# Patient Record
Sex: FEMALE | Race: WHITE | Employment: UNEMPLOYED | URBAN - METROPOLITAN AREA
[De-identification: names, ages, dates, MRNs, and addresses within clinical notes are randomized per-mention and may not be internally consistent; named-entity substitution may affect disease eponyms.]

---

## 2017-07-21 PROBLEM — Z37.9 NORMAL LABOR: Status: ACTIVE | Noted: 2017-07-21

## 2017-10-19 ENCOUNTER — APPOINTMENT (OUTPATIENT)
Dept: GENERAL RADIOLOGY | Age: 32
End: 2017-10-19
Attending: EMERGENCY MEDICINE
Payer: MEDICAID

## 2017-10-19 ENCOUNTER — HOSPITAL ENCOUNTER (EMERGENCY)
Age: 32
Discharge: HOME | End: 2017-10-19
Attending: EMERGENCY MEDICINE | Admitting: EMERGENCY MEDICINE
Payer: MEDICAID

## 2017-10-19 VITALS
HEART RATE: 96 BPM | RESPIRATION RATE: 20 BRPM | HEIGHT: 58 IN | BODY MASS INDEX: 29.39 KG/M2 | DIASTOLIC BLOOD PRESSURE: 92 MMHG | WEIGHT: 140 LBS | SYSTOLIC BLOOD PRESSURE: 147 MMHG | OXYGEN SATURATION: 100 % | TEMPERATURE: 97.6 F

## 2017-10-19 DIAGNOSIS — S62.665A CLOSED NONDISPLACED FRACTURE OF DISTAL PHALANX OF LEFT RING FINGER, INITIAL ENCOUNTER: Primary | ICD-10-CM

## 2017-10-19 PROCEDURE — 73130 X-RAY EXAM OF HAND: CPT

## 2017-10-19 PROCEDURE — 77030008323 HC SPLNT FNGR GTR DJOR -A

## 2017-10-19 PROCEDURE — 99282 EMERGENCY DEPT VISIT SF MDM: CPT

## 2017-10-19 PROCEDURE — 74011250637 HC RX REV CODE- 250/637: Performed by: PHYSICIAN ASSISTANT

## 2017-10-19 RX ORDER — HYDROCODONE BITARTRATE AND ACETAMINOPHEN 5; 325 MG/1; MG/1
1 TABLET ORAL
Qty: 20 TAB | Refills: 0 | Status: SHIPPED | OUTPATIENT
Start: 2017-10-19 | End: 2018-10-10

## 2017-10-19 RX ORDER — HYDROCODONE BITARTRATE AND ACETAMINOPHEN 5; 325 MG/1; MG/1
1 TABLET ORAL
Status: COMPLETED | OUTPATIENT
Start: 2017-10-19 | End: 2017-10-19

## 2017-10-19 RX ADMIN — HYDROCODONE BITARTRATE AND ACETAMINOPHEN 1 TABLET: 5; 325 TABLET ORAL at 09:40

## 2017-10-19 NOTE — ED PROVIDER NOTES
HPI Comments: 30yoF to ED c/o left 4th finger pain onset last night after wrestling. Pt states she bent her finger somehow and felt a snap. Worsening pain and swelling today. Pt is right hand dominant. Patient is a 29 y.o. female presenting with hand injury. The history is provided by the patient. Hand Injury           Past Medical History:   Diagnosis Date    Anemia NEC     Bronchitis     H/O hernia repair     Psychiatric disorder     bipolar, depression, PTSD       Past Surgical History:   Procedure Laterality Date    ABDOMEN SURGERY PROC UNLISTED      hernia     DELIVERY ONLY      4 previous    HX OTHER SURGICAL           No family history on file. Social History     Social History    Marital status: SINGLE     Spouse name: N/A    Number of children: N/A    Years of education: N/A     Occupational History    Not on file. Social History Main Topics    Smoking status: Never Smoker    Smokeless tobacco: Never Used    Alcohol use No    Drug use: No    Sexual activity: Yes     Partners: Male     Birth control/ protection: None      Comment: last menses in 2012     Other Topics Concern    Not on file     Social History Narrative         ALLERGIES: Percocet [oxycodone-acetaminophen] and Zofran odt [ondansetron]    Review of Systems   Musculoskeletal: Positive for arthralgias and joint swelling. All other systems reviewed and are negative. Vitals:    10/19/17 0905   BP: (!) 147/92   Pulse: 96   Resp: 20   Temp: 97.6 °F (36.4 °C)   SpO2: 100%   Weight: 63.5 kg (140 lb)   Height: 4' 10\" (1.473 m)            Physical Exam   Constitutional: She appears well-developed and well-nourished. No distress. HENT:   Head: Normocephalic and atraumatic. Musculoskeletal:        Hands:  Skin: She is not diaphoretic. MDM  Number of Diagnoses or Management Options  Diagnosis management comments: Pt c/o left 4th finger pain s/p injury last night.  Fx noted on exam. Will splint and refer to ortho. Pt agrees with plan. WENDY Jaramillo 9:35 AM         Amount and/or Complexity of Data Reviewed  Tests in the radiology section of CPT®: reviewed and ordered    Risk of Complications, Morbidity, and/or Mortality  Presenting problems: low  Diagnostic procedures: low  Management options: low    Patient Progress  Patient progress: improved    ED Course       Splint, Finger  Date/Time: 10/19/2017 9:36 AM  Performed by: Kendrick Byrnes  Authorized by: Kendrick Byrnes     Consent:     Consent obtained:  Verbal    Consent given by:  Patient    Risks discussed:  Discoloration and numbness  Pre-procedure details:     Sensation:  Normal    Skin color:  Pink  Procedure details:     Laterality:  Left    Location:  Finger    Finger:  L ring finger    Splint type: aluminum     Supplies:  Aluminum splint  Post-procedure details:     Pain:  Unchanged    Sensation:  Normal    Skin color:  Pink    Patient tolerance of procedure: Tolerated well, no immediate complications                 Vitals:  Patient Vitals for the past 12 hrs:   Temp Pulse Resp BP SpO2   10/19/17 0905 97.6 °F (36.4 °C) 96 20 (!) 147/92 100 %       Medications Ordered:  Medications   HYDROcodone-acetaminophen (NORCO) 5-325 mg per tablet 1 Tab (1 Tab Oral Given 10/19/17 0940)       Lab Findings:  No results found for this or any previous visit (from the past 12 hour(s)). X-ray, CT or radiology findings or impressions:  Xr Hand Lt Min 3 V    Result Date: 10/19/2017  Left Hand Three Views CPT CODE: 86448 HISTORY: Left hand pain following wrestling injury. COMPARISON: None. FINDINGS: Three views obtained. There is mild soft tissue swelling of the fourth digit. Transverse fracture minimally offset of the distal aspect of the middle phalanx of the fourth digit. The joint spaces are maintained. There is no fracture or dislocation.  Mineralization is normal.      IMPRESSION: Minimally displaced transverse fracture through the distal aspect of the middle phalanx of the fourth digit. Diagnosis:   1. Closed nondisplaced fracture of distal phalanx of left ring finger, initial encounter        Disposition: Home    Follow-up Information     Follow up With Details Comments [address], [place]   [address] [address]  [phone]             Patient's Medications   Start Taking    HYDROCODONE-ACETAMINOPHEN (NORCO) 5-325 MG PER TABLET    Take 1 Tab by mouth every four (4) hours as needed for Pain. Max Daily Amount: 6 Tabs. Continue Taking    ACETAMINOPHEN (TYLENOL) 325 MG TABLET    Take 650 mg by mouth every four (4) hours as needed for Pain. FERROUS SULFATE 325 MG (65 MG IRON) TABLET    Take 1 Tab by mouth Daily (before breakfast). NORETHINDRONE-ETHINYL ESTRADIOL-IRON (MICROGESTIN FE1.5/30) 1.5-30 MG-MCG TABLET    Take 1 tablet by mouth daily. PNV W/O CALCIUM-IRON FUM-FA 27-1 MG TAB    Take 1 Tab by mouth daily. QUETIAPINE (SEROQUEL) 100 MG TABLET    Take 1 Tab by mouth two (2) times a day. SERTRALINE (ZOLOFT) 100 MG TABLET    Take 0.5 Tabs by mouth daily. These Medications have changed    No medications on file   Stop Taking    No medications on file   . ed

## 2017-10-19 NOTE — DISCHARGE INSTRUCTIONS
Finger Fracture: Care Instructions  Your Care Instructions    Breaks in the bones of the finger usually heal well in about 3 to 4 weeks. The pain and swelling from a broken finger can last for weeks. But it should steadily improve, starting a few days after you break it. It is very important that you wear and take care of the cast or splint exactly as your doctor tells you to so that your finger heals properly and does not end up crooked. Wearing a splint may interfere with your normal activities. Ask for help with daily tasks if you need it. You heal best when you take good care of yourself. Eat a variety of healthy foods, and don't smoke. Follow-up care is a key part of your treatment and safety. Be sure to make and go to all appointments, and call your doctor if you are having problems. It's also a good idea to know your test results and keep a list of the medicines you take. How can you care for yourself at home? · If your doctor put a splint on your finger, wear the splint exactly as directed. Do not remove it until your doctor says that you can. · Keep your hand raised above the level of your heart as much as you can. This will help reduce swelling. · Put ice or a cold pack on your finger for 10 to 20 minutes at a time. Try to do this every 1 to 2 hours for the next 3 days (when you are awake) or until the swelling goes down. Put a thin cloth between the ice and your skin. Keep the splint dry. · Be safe with medicines. Take pain medicines exactly as directed. ¨ If the doctor gave you a prescription medicine for pain, take it as prescribed. ¨ If you are not taking a prescription pain medicine, ask your doctor if you can take an over-the-counter medicine. When should you call for help? Call 911 anytime you think you may need emergency care. For example, call if:  · Your finger is cool or pale or changes color.   Call your doctor now or seek immediate medical care if:  · Your pain gets much worse.  · You have tingling, weakness, or numbness in your finger. · You have signs of infection, such as:  ¨ Increased pain, swelling, warmth, or redness. ¨ Red streaks leading from the area. ¨ Pus draining from the area. ¨ Swollen lymph nodes in your neck, armpits, or groin. ¨ A fever. Watch closely for changes in your health, and be sure to contact your doctor if:  · Your finger is not steadily improving. Where can you learn more? Go to http://chito-jose manuel.info/. Enter A502 in the search box to learn more about \"Finger Fracture: Care Instructions. \"  Current as of: March 21, 2017  Content Version: 11.3  © 9581-1213 [employer]. Care instructions adapted under license by [employer] (which disclaims liability or warranty for this information). If you have questions about a medical condition or this instruction, always ask your healthcare professional. [place] [address] any warranty or liability for your use of this information.

## 2017-10-19 NOTE — ED NOTES
PT discharged home per provider order, educated patient on discharge instructions, medication, splint and fracture care and follow up care, verbalized understanding, ambulated out of ED

## 2017-10-19 NOTE — LETTER
[address] [place] [address] [facility] EMERGENCY DEPT 
[address] [facility] [address] 
[phone] Work/School Note Date: 10/19/2017 To Whom It May concern: 
 
Teressa Townsend was seen and treated today in the emergency room by the following provider(s): 
Physician Assistant: WENDY Camacho. Teressa Kristian limited duty: no use of injured hand until cleared by ortho. Sincerely, WENDY Camacho

## 2017-10-23 ENCOUNTER — OFFICE VISIT (OUTPATIENT)
Dept: ORTHOPEDIC SURGERY | Facility: CLINIC | Age: 32
End: 2017-10-23

## 2017-10-23 VITALS
HEART RATE: 98 BPM | RESPIRATION RATE: 18 BRPM | HEIGHT: 58 IN | OXYGEN SATURATION: 99 % | DIASTOLIC BLOOD PRESSURE: 74 MMHG | TEMPERATURE: 98.4 F | SYSTOLIC BLOOD PRESSURE: 117 MMHG | WEIGHT: 162.4 LBS | BODY MASS INDEX: 34.09 KG/M2

## 2017-10-23 DIAGNOSIS — M79.642 LEFT HAND PAIN: ICD-10-CM

## 2017-10-23 DIAGNOSIS — S62.665A CLOSED NONDISPLACED FRACTURE OF DISTAL PHALANX OF LEFT RING FINGER, INITIAL ENCOUNTER: Primary | ICD-10-CM

## 2017-10-23 RX ORDER — OXYCODONE AND ACETAMINOPHEN 5; 325 MG/1; MG/1
TABLET ORAL
COMMUNITY
End: 2018-01-22

## 2017-10-23 NOTE — PROGRESS NOTES
Patient: Saintclair Ma                MRN: [id number]       SSN: [id number]  YOB: 1985        AGE: 29 y.o. SEX: female    PCP: Taylor Alberto MD  10/23/17    Chief Complaint   Patient presents with    Hand Pain     Left     HISTORY:  Saintclair Ma is a 29 y.o. female who is seen for a left ring finger pain. She states on 10/19/17 she was wrestling with her boyfriend and injured her left ring finger. She states that she had jumped on his back and her left ring finger got wrapped in his shirt. She states the tip of her left ring finger was hyperflexed and she reduced her own fracture. She noticed the pain increased later on. She was seen at [facility] ED on the same day where x-rays revealed a nondisplaced left ring finger distal phalanx fracture. 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 She states her job recently became a paid position. She lives in [place] with her boyfriend and 7 children- 6, 8, 5, 6 1 and 4 month old. She has 3 boys and 4 girls. She is accompanied by her 1year old son Star Mendez and her 4 month old daughter Tim Grey. Current weight is 162 pounds. She reports she has lost 20 pounds since delivering Tim Grey via . She is 4'10\" tall. She is not hypertensive or diabetic. No results found for: HBA1C, HGBE8, CUO8UIWR, VPJ9WJWZ, YVP2MDSH  Weight Metrics 10/23/2017 10/19/2017 2017 2017 2017 2015 8/15/2015   Weight 162 lb 6.4 oz 140 lb 180 lb 168 lb 165 lb 145 lb 130 lb   BMI 33.94 kg/m2 29.26 kg/m2 35.15 kg/m2 32.81 kg/m2 32.22 kg/m2 28.81 kg/m2 25.83 kg/m2       Patient Active Problem List   Diagnosis Code    Normal labor O80, Z37.9     REVIEW OF SYSTEMS: All Below are Negative except: See HPI   Constitutional: negative for fever, chills, and weight loss.    Cardiovascular: negative for chest pain, claudication, leg swelling, SOB, SALCEDO   Gastrointestinal: Negative for pain, N/V/C/D, Blood in stool or urine, dysuria,  hematuria, incontinence, pelvic pain. Musculoskeletal: See HPI   Neurological: Negative for dizziness and weakness. Negative for headaches, Visual changes, confusion, seizures   Phychiatric/Behavioral: Negative for depression, memory loss, substance  abuse. Extremities: Negative for hair changes, rash, or skin lesion changes. Hematologic: Negative for bleeding problems, bruising, pallor or swollen lymph  nodes   Peripheral Vascular: No calf pain, no circulation deficits. Social History     Social History    Marital status: SINGLE     Spouse name: N/A    Number of children: N/A    Years of education: N/A     Occupational History    Not on file. Social History Main Topics    Smoking status: Never Smoker    Smokeless tobacco: Never Used    Alcohol use No    Drug use: No    Sexual activity: Yes     Partners: Male     Birth control/ protection: None      Comment: last menses in August 2012     Other Topics Concern    Not on file     Social History Narrative      Allergies   Allergen Reactions    Percocet [Oxycodone-Acetaminophen] Itching     Pt denies allergy    Zofran Odt [Ondansetron] Hives      Current Outpatient Prescriptions   Medication Sig    oxyCODONE-acetaminophen (PERCOCET) 5-325 mg per tablet Take  by mouth every four (4) hours as needed for Pain.  HYDROcodone-acetaminophen (NORCO) 5-325 mg per tablet Take 1 Tab by mouth every four (4) hours as needed for Pain. Max Daily Amount: 6 Tabs.  QUEtiapine (SEROQUEL) 100 mg tablet Take 1 Tab by mouth two (2) times a day.  sertraline (ZOLOFT) 100 mg tablet Take 0.5 Tabs by mouth daily.  acetaminophen (TYLENOL) 325 mg tablet Take 650 mg by mouth every four (4) hours as needed for Pain.  norethindrone-ethinyl estradiol-iron (MICROGESTIN FE1.5/30) 1.5-30 mg-mcg tablet Take 1 tablet by mouth daily.     ferrous sulfate 325 mg (65 mg iron) tablet Take 1 Tab by mouth Daily (before breakfast).  pnv w/o calcium-iron fum-fa 27-1 mg tab Take 1 Tab by mouth daily. No current facility-administered medications for this visit. PHYSICAL EXAMINATION:  Visit Vitals    /74    Pulse 98    Temp 98.4 °F (36.9 °C) (Oral)    Resp 18    Ht 4' 10\" (1.473 m)    Wt 162 lb 6.4 oz (73.7 kg)    SpO2 99%    BMI 33.94 kg/m2      ORTHO EXAMINATION:  Examination Right Hand Left Hand   Skin Intact Intact   Deformity - -   Swelling - + distal aspect of middle phalanx   Tenderness - +, distal aspect of middle phalanx   Finger flexion Full Full   Finger extension Full Full   Sensation Normal Normal   Capillary refill Normal Normal   Heberden's nodes - -   Dupuytren's - -   Wearing a volar splint today. RADIOGRAPHS:  XR LEFT HAND 10/19/17  IMPRESSION:   Minimally displaced transverse fracture through the distal aspect of the middle  phalanx of the fourth digit.  -I have independently reviewed these images during this office visit. -Dr. Clark Mjeias:      ICD-10-CM ICD-9-CM    1. Closed nondisplaced fracture of distal phalanx of left ring finger, initial encounter S62.665A 816.02 OR CLOSE RX PROX/MID FING SHFT FX   2. Left hand pain M79.645 729.5      PLAN: She will follow up in one week for re-xray. There is no need for surgery at this time. She was placed in a link splint today.      Scribed by [facility] [place] ([address]) as dictated by Mayda Castro MD

## 2017-10-23 NOTE — MR AVS SNAPSHOT
Visit Information Date & Time Provider Department Dept. Phone Encounter #  
 10/23/2017 10:30 AM Michael Al MD [facility] [phone] Follow-up Instructions Return in about 1 week (around 10/30/2017). Follow-up and Disposition History Upcoming Health Maintenance Date Due DTaP/Tdap/Td series (1 - Tdap) 6/23/2008 PAP AKA CERVICAL CYTOLOGY 9/17/2016 INFLUENZA AGE 9 TO ADULT 8/1/2017 Allergies as of 10/23/2017  Review Complete On: 10/23/2017 By: Michael Al MD  
  
 Severity Noted Reaction Type Reactions Percocet [Oxycodone-acetaminophen] Medium 03/20/2013   Systemic Itching Pt denies allergy Zofran Odt [Ondansetron]  07/21/2017    Hives Current Immunizations  Never Reviewed No immunizations on file. Not reviewed this visit You Were Diagnosed With   
  
 Codes Comments Closed nondisplaced fracture of distal phalanx of left ring finger, initial encounter    -  Primary ICD-10-CM: H27.959H ICD-9-CM: 816.02 Left hand pain     ICD-10-CM: H33.515 ICD-9-CM: 729.5 Vitals BP Pulse Temp Resp Height(growth percentile) Weight(growth percentile) 117/74 98 98.4 °F (36.9 °C) (Oral) 18 4' 10\" (1.473 m) 162 lb 6.4 oz (73.7 kg) SpO2 BMI OB Status Smoking Status 99% 33.94 kg/m2 Recent pregnancy Never Smoker BMI and BSA Data Body Mass Index Body Surface Area 33.94 kg/m 2 1.74 m 2 Preferred Pharmacy Pharmacy Name Phone [id number] [phone] Your Updated Medication List  
  
   
This list is accurate as of: 10/23/17 11:10 AM.  Always use your most recent med list.  
  
  
  
  
 ferrous sulfate 325 mg (65 mg iron) tablet Take 1 Tab by mouth Daily (before breakfast). HYDROcodone-acetaminophen 5-325 mg per tablet Commonly known as:  Scott Velazquez  
 Take 1 Tab by mouth every four (4) hours as needed for Pain. Max Daily Amount: 6 Tabs. norethindrone-ethinyl estradiol-iron 1.5 mg-30 mcg (21)/75 mg (7) Tab Commonly known as:  Weston Daniel FE1.5/30 Take 1 tablet by mouth daily. PERCOCET 5-325 mg per tablet Generic drug:  oxyCODONE-acetaminophen Take  by mouth every four (4) hours as needed for Pain. pnv w/o calcium-iron fum-fa 27-1 mg Tab Take 1 Tab by mouth daily. QUEtiapine 100 mg tablet Commonly known as:  SEROquel Take 1 Tab by mouth two (2) times a day. sertraline 100 mg tablet Commonly known as:  ZOLOFT Take 0.5 Tabs by mouth daily. TYLENOL 325 mg tablet Generic drug:  acetaminophen Take 650 mg by mouth every four (4) hours as needed for Pain. We Performed the Following AK CLOSE RX PROX/MID FING SHFT FX [id number] CPT(R)] Follow-up Instructions Return in about 1 week (around 10/30/2017). Patient Instructions Finger Fracture: Care Instructions Your Care Instructions Breaks in the bones of the finger usually heal well in about 3 to 4 weeks. The pain and swelling from a broken finger can last for weeks. But it should steadily improve, starting a few days after you break it. It is very important that you wear and take care of the cast or splint exactly as your doctor tells you to so that your finger heals properly and does not end up crooked. Wearing a splint may interfere with your normal activities. Ask for help with daily tasks if you need it. You heal best when you take good care of yourself. Eat a variety of healthy foods, and don't smoke. Follow-up care is a key part of your treatment and safety. Be sure to make and go to all appointments, and call your doctor if you are having problems. It's also a good idea to know your test results and keep a list of the medicines you take. How can you care for yourself at home? · If your doctor put a splint on your finger, wear the splint exactly as directed. Do not remove it until your doctor says that you can. · Keep your hand raised above the level of your heart as much as you can. This will help reduce swelling. · Put ice or a cold pack on your finger for 10 to 20 minutes at a time. Try to do this every 1 to 2 hours for the next 3 days (when you are awake) or until the swelling goes down. Put a thin cloth between the ice and your skin. Keep the splint dry. · Be safe with medicines. Take pain medicines exactly as directed. ¨ If the doctor gave you a prescription medicine for pain, take it as prescribed. ¨ If you are not taking a prescription pain medicine, ask your doctor if you can take an over-the-counter medicine. When should you call for help? Call 911 anytime you think you may need emergency care. For example, call if: 
· Your finger is cool or pale or changes color. Call your doctor now or seek immediate medical care if: 
· Your pain gets much worse. · You have tingling, weakness, or numbness in your finger. · You have signs of infection, such as: 
¨ Increased pain, swelling, warmth, or redness. ¨ Red streaks leading from the area. ¨ Pus draining from the area. ¨ Swollen lymph nodes in your neck, armpits, or groin. ¨ A fever. Watch closely for changes in your health, and be sure to contact your doctor if: 
· Your finger is not steadily improving. Where can you learn more? Go to http://chito-jose manuel.info/. Enter G102 in the search box to learn more about \"Finger Fracture: Care Instructions. \" Current as of: March 21, 2017 Content Version: 11.3 © 9789-1009 [employer]Lev. Care instructions adapted under license by [employer] (which disclaims liability or warranty for this information).  If you have questions about a medical condition or this instruction, always ask your healthcare professional. [employer] disclaims any warranty or liability for your use of this information. Patient Instructions History Introducing [facility] SERVICES! Dear Kirsten Jameson: 
Thank you for requesting a Samfind account. Our records indicate that you already have an active Samfind account. You can access your account anytime at [email or web address] Did you know that you can access your hospital and ER discharge instructions at any time in Samfind? You can also review all of your test results from your hospital stay or ER visit. Additional Information If you have questions, please visit the Frequently Asked Questions section of the Samfind website at [email or web address] Remember, Samfind is NOT to be used for urgent needs. For medical emergencies, dial 911. Now available from your iPhone and Android! Please provide this summary of care documentation to your next provider. Your primary care clinician is listed as Guerrero Fearing. If you have any questions after today's visit, please call [phone].

## 2017-10-23 NOTE — PATIENT INSTRUCTIONS
Finger Fracture: Care Instructions  Your Care Instructions    Breaks in the bones of the finger usually heal well in about 3 to 4 weeks. The pain and swelling from a broken finger can last for weeks. But it should steadily improve, starting a few days after you break it. It is very important that you wear and take care of the cast or splint exactly as your doctor tells you to so that your finger heals properly and does not end up crooked. Wearing a splint may interfere with your normal activities. Ask for help with daily tasks if you need it. You heal best when you take good care of yourself. Eat a variety of healthy foods, and don't smoke. Follow-up care is a key part of your treatment and safety. Be sure to make and go to all appointments, and call your doctor if you are having problems. It's also a good idea to know your test results and keep a list of the medicines you take. How can you care for yourself at home? · If your doctor put a splint on your finger, wear the splint exactly as directed. Do not remove it until your doctor says that you can. · Keep your hand raised above the level of your heart as much as you can. This will help reduce swelling. · Put ice or a cold pack on your finger for 10 to 20 minutes at a time. Try to do this every 1 to 2 hours for the next 3 days (when you are awake) or until the swelling goes down. Put a thin cloth between the ice and your skin. Keep the splint dry. · Be safe with medicines. Take pain medicines exactly as directed. ¨ If the doctor gave you a prescription medicine for pain, take it as prescribed. ¨ If you are not taking a prescription pain medicine, ask your doctor if you can take an over-the-counter medicine. When should you call for help? Call 911 anytime you think you may need emergency care. For example, call if:  · Your finger is cool or pale or changes color.   Call your doctor now or seek immediate medical care if:  · Your pain gets much worse.  · You have tingling, weakness, or numbness in your finger. · You have signs of infection, such as:  ¨ Increased pain, swelling, warmth, or redness. ¨ Red streaks leading from the area. ¨ Pus draining from the area. ¨ Swollen lymph nodes in your neck, armpits, or groin. ¨ A fever. Watch closely for changes in your health, and be sure to contact your doctor if:  · Your finger is not steadily improving. Where can you learn more? Go to http://chito-jose manuel.info/. Enter B870 in the search box to learn more about \"Finger Fracture: Care Instructions. \"  Current as of: March 21, 2017  Content Version: 11.3  © 8997-7792 [email or web address]. Care instructions adapted under license by [employer] (which disclaims liability or warranty for this information). If you have questions about a medical condition or this instruction, always ask your healthcare professional. [place] [address] any warranty or liability for your use of this information.

## 2018-01-22 ENCOUNTER — HOSPITAL ENCOUNTER (EMERGENCY)
Age: 33
Discharge: HOME | End: 2018-01-22
Attending: EMERGENCY MEDICINE | Admitting: EMERGENCY MEDICINE
Payer: MEDICAID

## 2018-01-22 ENCOUNTER — APPOINTMENT (OUTPATIENT)
Dept: GENERAL RADIOLOGY | Age: 33
End: 2018-01-22
Attending: EMERGENCY MEDICINE
Payer: MEDICAID

## 2018-01-22 VITALS
OXYGEN SATURATION: 100 % | WEIGHT: 145 LBS | SYSTOLIC BLOOD PRESSURE: 112 MMHG | DIASTOLIC BLOOD PRESSURE: 67 MMHG | RESPIRATION RATE: 17 BRPM | BODY MASS INDEX: 28.47 KG/M2 | TEMPERATURE: 100.5 F | HEIGHT: 60 IN | HEART RATE: 98 BPM

## 2018-01-22 DIAGNOSIS — R51.9 NONINTRACTABLE EPISODIC HEADACHE, UNSPECIFIED HEADACHE TYPE: ICD-10-CM

## 2018-01-22 DIAGNOSIS — J06.9 ACUTE UPPER RESPIRATORY INFECTION: Primary | ICD-10-CM

## 2018-01-22 LAB — HCG UR QL: NEGATIVE

## 2018-01-22 PROCEDURE — 81025 URINE PREGNANCY TEST: CPT | Performed by: EMERGENCY MEDICINE

## 2018-01-22 PROCEDURE — 99283 EMERGENCY DEPT VISIT LOW MDM: CPT

## 2018-01-22 PROCEDURE — 71046 X-RAY EXAM CHEST 2 VIEWS: CPT

## 2018-01-22 RX ORDER — GUAIFENESIN/DEXTROMETHORPHAN 100-10MG/5
10 SYRUP ORAL
Qty: 118 ML | Refills: 0 | Status: SHIPPED | OUTPATIENT
Start: 2018-01-22 | End: 2018-01-29

## 2018-01-22 RX ORDER — IBUPROFEN 600 MG/1
600 TABLET ORAL
Qty: 12 TAB | Refills: 0 | Status: SHIPPED | OUTPATIENT
Start: 2018-01-22 | End: 2018-01-27

## 2018-01-22 NOTE — ED PROVIDER NOTES
EMERGENCY DEPARTMENT HISTORY AND PHYSICAL EXAM    10:38 AM      Date: 2018  Patient Name: Adilson Paz    History of Presenting Illness     Chief Complaint   Patient presents with    Chest Congestion    Headache    Vomiting         History Provided By: Patient    Chief Complaint: URI  Duration:  Days  Timing:  Acute  Location: respiratory  Quality: n/a  Severity: Mild  Modifying Factors: n/a  Associated Symptoms:  Pt reports congestion, dry cough, sharp intermittent HA, fever, and chills. All other sx denied. No other complaints at this time. Additional History (Context): HPI Comments: 10:38 AM Adilson Paz is a 29 y.o. female with a h/o Psychiatric disorder, Hernia repair,  presents to the ED with c/o URI onset 2 days ago. Pt states she has been taking cough drops, Vicodin from her [facility] 5 months ago. Pt reports congestion, dry cough, sharp intermittent HA, fever, and chills. Pt denied n/v/d, CP, SOB, urinary sx's, numbness, weakness. All other sx denied. No other complaints at this time. Vikas Murguia MD                Past History     Past Medical History:  Past Medical History:   Diagnosis Date    Anemia NEC     Bronchitis     H/O hernia repair     Psychiatric disorder     bipolar, depression, PTSD       Past Surgical History:  Past Surgical History:   Procedure Laterality Date    ABDOMEN SURGERY PROC UNLISTED      hernia     DELIVERY ONLY      4 previous    HX OTHER SURGICAL         Family History:  History reviewed. No pertinent family history. Social History:  Social History   Substance Use Topics    Smoking status: Current Every Day Smoker     Packs/day: 0.25    Smokeless tobacco: Never Used    Alcohol use Yes      Comment: social       Allergies:   Allergies   Allergen Reactions    Percocet [Oxycodone-Acetaminophen] Itching     Pt denies allergy    Zofran Odt [Ondansetron] Hives         Review of Systems     Review of Systems   Constitutional: Positive for chills and fever. HENT: Positive for congestion. Negative for sore throat. Eyes: Negative for redness. Respiratory: Positive for cough. Negative for shortness of breath and wheezing. Gastrointestinal: Negative for abdominal pain, diarrhea, nausea and vomiting. Genitourinary: Negative for dysuria. Musculoskeletal: Negative for neck stiffness. Skin: Negative for pallor. Neurological: Positive for headaches. Hematological: Does not bruise/bleed easily. All other systems reviewed and are negative. Physical Exam     Visit Vitals    /67 (BP 1 Location: Left arm, BP Patient Position: At rest)    Pulse 98    Temp (!) 100.5 °F (38.1 °C)    Resp 17    Ht 5' (1.524 m)    Wt 65.8 kg (145 lb)    LMP 01/02/2018    SpO2 100%    BMI 28.32 kg/m2       Physical Exam   Constitutional: She is oriented to person, place, and time. She appears well-developed and well-nourished. No distress. Nontoxic appearing   HENT:   Head: Normocephalic and atraumatic. Nose: Nose normal.   Mouth/Throat: Oropharynx is clear and moist. No oropharyngeal exudate. Uvula midline. Eyes: Conjunctivae are normal. Pupils are equal, round, and reactive to light. No scleral icterus. Neck: Normal range of motion. Neck supple. Cardiovascular: Intact distal pulses. Capillary refill < 3 seconds   Pulmonary/Chest: Effort normal and breath sounds normal. No respiratory distress. She has no wheezes. Active cough at the bedside. Abdominal: Soft. Bowel sounds are normal. She exhibits no distension. There is no tenderness. Musculoskeletal: Normal range of motion. She exhibits no edema. Lymphadenopathy:     She has no cervical adenopathy. Neurological: She is alert and oriented to person, place, and time. No cranial nerve deficit. Skin: Skin is warm and dry. No rash noted. She is not diaphoretic. Psychiatric: Her behavior is normal.   Nursing note and vitals reviewed.         Diagnostic Study Results     Labs -  Recent Results (from the past 12 hour(s))   HCG URINE, QL    Collection Time: 01/22/18 11:35 AM   Result Value Ref Range    HCG urine, Ql. NEGATIVE  NEG           Medical Decision Making     MDM    ddx URI, PNA, bronchitis  Get CxR    Procedures:     Vitals:  Patient Vitals for the past 12 hrs:   Temp Pulse Resp BP SpO2   01/22/18 1016 (!) 100.5 °F (38.1 °C) (!) 106 20 116/72 99 %         Medications ordered:   Medications - No data to display      Lab findings:  Recent Results (from the past 12 hour(s))   HCG URINE, QL    Collection Time: 01/22/18 11:35 AM   Result Value Ref Range    HCG urine, Ql. NEGATIVE  NEG             X-Ray, CT or other radiology findings or impressions:  XR CHEST PA LAT     IMPRESSION  IMPRESSION:     Probable mild bronchitis.     No evidence of confluent pneumonia or any other diagnostic finding. Progress notes, Consult notes or additional Procedure notes:   I have reassessed the patient. I have discussed the workup, results and plan with the patient and patient is in agreement. Patient has no new complaint. Patient will be prescribed robitussin DM, saline nasal, motrin, . Patient was discharge in stable condition. Patient was given outpatient follow up. Patient is to return to emergency department if any new or worsening condition. Disposition:  Diagnosis:   1. Acute upper respiratory infection    2. Nonintractable episodic headache, unspecified headache type        Disposition: d/c    Follow-up Information     Follow up With Details Comments Contact Info    MD Ambrosio Schedule an appointment as soon as possible for a visit  [address] [place], [place] [id number] [phone]             Patient's Medications   Start Taking    GUAIFENESIN-DEXTROMETHORPHAN (ROBITUSSIN DM) 100-10 MG/5 ML SYRUP    Take 10 mL by mouth three (3) times daily as needed for Cough for up to 7 days.  Indications: COLD SYMPTOMS    IBUPROFEN (MOTRIN) 600 MG TABLET Take 1 Tab by mouth every six (6) hours as needed for Pain for up to 5 days. Indications: Fever, Pain    SODIUM CHLORIDE (SALINE MIST) 0.65 % NASAL SPRAY    1 [place] by Both Nostrils route as needed for Congestion. Indications: Nasal Congestion   Continue Taking    FERROUS SULFATE 325 MG (65 MG IRON) TABLET    Take 1 Tab by mouth Daily (before breakfast). HYDROCODONE-ACETAMINOPHEN (NORCO) 5-325 MG PER TABLET    Take 1 Tab by mouth every four (4) hours as needed for Pain. Max Daily Amount: 6 Tabs. These Medications have changed    No medications on file   Stop Taking    ACETAMINOPHEN (TYLENOL) 325 MG TABLET    Take 650 mg by mouth every four (4) hours as needed for Pain. NORETHINDRONE-ETHINYL ESTRADIOL-IRON (MICROGESTIN FE1.5/30) 1.5-30 MG-MCG TABLET    Take 1 tablet by mouth daily. OXYCODONE-ACETAMINOPHEN (PERCOCET) 5-325 MG PER TABLET    Take  by mouth every four (4) hours as needed for Pain. PNV W/O CALCIUM-IRON FUM-FA 27-1 MG TAB    Take 1 Tab by mouth daily. QUETIAPINE (SEROQUEL) 100 MG TABLET    Take 1 Tab by mouth two (2) times a day. SERTRALINE (ZOLOFT) 100 MG TABLET    Take 0.5 Tabs by mouth daily. Scribe [id number] [facility] acting as a scribe for and in the presence of Augustine Hodge DO      January 22, 2018 at 12:30 PM       Provider Attestation:      I personally performed the services described in the documentation, reviewed the documentation, as recorded by the scribe in my presence, and it accurately and completely records my words and actions.  January 22, 2018 at 12:30 PM - Augustine Hodge DO

## 2018-01-22 NOTE — DISCHARGE INSTRUCTIONS
Saline Nasal Washes: Care Instructions  Your Care Instructions  Saline nasal washes help keep the nasal passages open by washing out thick or dried mucus. This simple remedy can help relieve symptoms of allergies, sinusitis, and colds. It also can make the nose feel more comfortable by keeping the mucous membranes moist. You may notice a little burning sensation in your nose the first few times you use the solution, but this usually gets better in a few days. Follow-up care is a key part of your treatment and safety. Be sure to make and go to all appointments, and call your doctor if you are having problems. It's also a good idea to know your test results and keep a list of the medicines you take. How can you care for yourself at home? · You can buy premixed saline solution in a squeeze bottle or other sinus rinse products at a drugstore. Read and follow the instructions on the label. · You also can make your own saline solution by adding 1 teaspoon of salt and 1 teaspoon of baking soda to 2 cups of distilled water. · If you use a homemade solution, pour a small amount into a clean bowl. Using a rubber bulb syringe, squeeze the syringe and place the tip in the salt water. Pull a small amount of the salt water into the syringe by relaxing your hand. · Sit down with your head tilted slightly back. Do not lie down. Put the tip of the bulb syringe or the squeeze bottle a little way into one of your nostrils. Gently drip or squirt a few drops into the nostril. Repeat with the other nostril. Some sneezing and gagging are normal at first.  · Gently blow your nose. · Wipe the syringe or bottle tip clean after each use. · Repeat this 2 or 3 times a day. · Use nasal washes gently if you have nosebleeds often. When should you call for help? Watch closely for changes in your health, and be sure to contact your doctor if:  ? · You often get nosebleeds. ? · You have problems doing the nasal washes.    Where can you learn more? Go to http://chito-jose manuel.info/. Enter [id number] [id number] in the search box to learn more about \"Saline Nasal Washes: Care Instructions. \"  Current as of: May 12, 2017  Content Version: 11.4  © 4356-1283 [employer]. Care instructions adapted under license by [employer] (which disclaims liability or warranty for this information). If you have questions about a medical condition or this instruction, always ask your healthcare professional. [address] any warranty or liability for your use of this information. Upper Respiratory Infection (Cold): Care Instructions  Your Care Instructions    An upper respiratory infection, or URI, is an infection of the nose, sinuses, or throat. URIs are spread by coughs, sneezes, and direct contact. The common cold is the most frequent kind of URI. The flu and sinus infections are other kinds of URIs. Almost all URIs are caused by viruses. Antibiotics won't cure them. But you can treat most infections with home care. This may include drinking lots of fluids and taking over-the-counter pain medicine. You will probably feel better in 4 to 10 days. The doctor has checked you carefully, but problems can develop later. If you notice any problems or new symptoms, get medical treatment right away. Follow-up care is a key part of your treatment and safety. Be sure to make and go to all appointments, and call your doctor if you are having problems. It's also a good idea to know your test results and keep a list of the medicines you take. How can you care for yourself at home? · To prevent dehydration, drink plenty of fluids, enough so that your urine is light yellow or clear like water. Choose water and other caffeine-free clear liquids until you feel better. If you have kidney, heart, or liver disease and have to limit fluids, talk with your doctor before you increase the amount of fluids you drink.   · Take an over-the-counter pain medicine, such as acetaminophen (Tylenol), ibuprofen (Advil, Motrin), or naproxen (Aleve). Read and follow all instructions on the label. · Before you use cough and cold medicines, check the label. These medicines may not be safe for young children or for people with certain health problems. · Be careful when taking over-the-counter cold or flu medicines and Tylenol at the same time. Many of these medicines have acetaminophen, which is Tylenol. Read the labels to make sure that you are not taking more than the recommended dose. Too much acetaminophen (Tylenol) can be harmful. · Get plenty of rest.  · Do not smoke or allow others to smoke around you. If you need help quitting, talk to your doctor about stop-smoking programs and medicines. These can increase your chances of quitting for good. When should you call for help? Call 911 anytime you think you may need emergency care. For example, call if:  ? · You have severe trouble breathing. ?Call your doctor now or seek immediate medical care if:  ? · You seem to be getting much sicker. ? · You have new or worse trouble breathing. ? · You have a new or higher fever. ? · You have a new rash. ? Watch closely for changes in your health, and be sure to contact your doctor if:  ? · You have a new symptom, such as a sore throat, an earache, or sinus pain. ? · You cough more deeply or more often, especially if you notice more mucus or a change in the color of your mucus. ? · You do not get better as expected. Where can you learn more? Go to http://chito-jose manuel.info/. Enter R956 in the search box to learn more about \"Upper Respiratory Infection (Cold): Care Instructions. \"  Current as of: May 12, 2017  Content Version: 11.4  © 7277-1741 [employer]. Care instructions adapted under license by [employer] (which disclaims liability or warranty for this information).  If you have questions about a medical condition or this instruction, always ask your healthcare professional. [place] [address] any warranty or liability for your use of this information.

## 2018-01-22 NOTE — LETTER
NOTIFICATION RETURN TO WORK / SCHOOL 
 
1/22/2018 12:19 PM 
 
Ms. Dominique Lopez [address] [address] To Whom It May Concern: 
 
Dominique Lopez is currently under the care of [id number] [place] [address] EMERGENCY DEPT. She will return to work/school on: 1/24/18 If there are questions or concerns please have the patient contact our office. Sincerely, Jaswinder Pabon, DO

## 2018-10-10 ENCOUNTER — HOSPITAL ENCOUNTER (EMERGENCY)
Age: 33
Discharge: HOME | End: 2018-10-10
Attending: EMERGENCY MEDICINE | Admitting: EMERGENCY MEDICINE
Payer: MEDICAID

## 2018-10-10 VITALS
HEIGHT: 60 IN | SYSTOLIC BLOOD PRESSURE: 112 MMHG | BODY MASS INDEX: 30.43 KG/M2 | RESPIRATION RATE: 16 BRPM | HEART RATE: 67 BPM | TEMPERATURE: 99.3 F | OXYGEN SATURATION: 100 % | DIASTOLIC BLOOD PRESSURE: 74 MMHG | WEIGHT: 155 LBS

## 2018-10-10 DIAGNOSIS — J02.9 ACUTE PHARYNGITIS, UNSPECIFIED ETIOLOGY: Primary | ICD-10-CM

## 2018-10-10 DIAGNOSIS — D64.9 ANEMIA, UNSPECIFIED TYPE: ICD-10-CM

## 2018-10-10 DIAGNOSIS — N39.0 URINARY TRACT INFECTION WITHOUT HEMATURIA, SITE UNSPECIFIED: ICD-10-CM

## 2018-10-10 LAB
ALBUMIN SERPL-MCNC: 3.3 G/DL (ref 3.4–5)
ALBUMIN/GLOB SERPL: 0.9 {RATIO} (ref 0.8–1.7)
ALP SERPL-CCNC: 36 U/L (ref 45–117)
ALT SERPL-CCNC: 22 U/L (ref 13–56)
ANION GAP SERPL CALC-SCNC: 4 MMOL/L (ref 3–18)
APPEARANCE UR: CLEAR
AST SERPL-CCNC: 21 U/L (ref 15–37)
BACTERIA URNS QL MICRO: ABNORMAL /HPF
BASOPHILS # BLD: 0 K/UL (ref 0–0.1)
BASOPHILS NFR BLD: 0 % (ref 0–2)
BILIRUB SERPL-MCNC: 0.1 MG/DL (ref 0.2–1)
BILIRUB UR QL: NEGATIVE
BUN SERPL-MCNC: 8 MG/DL (ref 7–18)
BUN/CREAT SERPL: 13 (ref 12–20)
CALCIUM SERPL-MCNC: 8.4 MG/DL (ref 8.5–10.1)
CAOX CRY URNS QL MICRO: ABNORMAL
CHLORIDE SERPL-SCNC: 108 MMOL/L (ref 100–108)
CO2 SERPL-SCNC: 29 MMOL/L (ref 21–32)
COLOR UR: YELLOW
CREAT SERPL-MCNC: 0.62 MG/DL (ref 0.6–1.3)
DIFFERENTIAL METHOD BLD: ABNORMAL
EOSINOPHIL # BLD: 0.1 K/UL (ref 0–0.4)
EOSINOPHIL NFR BLD: 2 % (ref 0–5)
EPITH CASTS URNS QL MICRO: ABNORMAL /LPF (ref 0–5)
ERYTHROCYTE [DISTWIDTH] IN BLOOD BY AUTOMATED COUNT: 18.4 % (ref 11.6–14.5)
GLOBULIN SER CALC-MCNC: 3.7 G/DL (ref 2–4)
GLUCOSE SERPL-MCNC: 90 MG/DL (ref 74–99)
GLUCOSE UR STRIP.AUTO-MCNC: NEGATIVE MG/DL
HCG SERPL QL: NEGATIVE
HCT VFR BLD AUTO: 28.6 % (ref 35–45)
HGB BLD-MCNC: 8.4 G/DL (ref 12–16)
HGB UR QL STRIP: ABNORMAL
KETONES UR QL STRIP.AUTO: ABNORMAL MG/DL
LEUKOCYTE ESTERASE UR QL STRIP.AUTO: ABNORMAL
LYMPHOCYTES # BLD: 2.4 K/UL (ref 0.9–3.6)
LYMPHOCYTES NFR BLD: 38 % (ref 21–52)
MCH RBC QN AUTO: 18.9 PG (ref 24–34)
MCHC RBC AUTO-ENTMCNC: 29.4 G/DL (ref 31–37)
MCV RBC AUTO: 64.4 FL (ref 74–97)
MONOCYTES # BLD: 0.7 K/UL (ref 0.05–1.2)
MONOCYTES NFR BLD: 11 % (ref 3–10)
MUCOUS THREADS URNS QL MICRO: ABNORMAL /LPF
NEUTS SEG # BLD: 3 K/UL (ref 1.8–8)
NEUTS SEG NFR BLD: 49 % (ref 40–73)
NITRITE UR QL STRIP.AUTO: NEGATIVE
PH UR STRIP: 5.5 [PH] (ref 5–8)
PLATELET # BLD AUTO: 227 K/UL (ref 135–420)
PLATELET COMMENTS,PCOM: ABNORMAL
PMV BLD AUTO: 10.5 FL (ref 9.2–11.8)
POTASSIUM SERPL-SCNC: 3.4 MMOL/L (ref 3.5–5.5)
PROT SERPL-MCNC: 7 G/DL (ref 6.4–8.2)
PROT UR STRIP-MCNC: ABNORMAL MG/DL
RBC # BLD AUTO: 4.44 M/UL (ref 4.2–5.3)
RBC #/AREA URNS HPF: ABNORMAL /HPF (ref 0–5)
RBC MORPH BLD: ABNORMAL
RBC MORPH BLD: ABNORMAL
SODIUM SERPL-SCNC: 141 MMOL/L (ref 136–145)
SP GR UR REFRACTOMETRY: >1.03 (ref 1–1.03)
UROBILINOGEN UR QL STRIP.AUTO: 1 EU/DL (ref 0.2–1)
WBC # BLD AUTO: 6.2 K/UL (ref 4.6–13.2)
WBC URNS QL MICRO: ABNORMAL /HPF (ref 0–4)

## 2018-10-10 PROCEDURE — 87086 URINE CULTURE/COLONY COUNT: CPT | Performed by: EMERGENCY MEDICINE

## 2018-10-10 PROCEDURE — 85025 COMPLETE CBC W/AUTO DIFF WBC: CPT | Performed by: EMERGENCY MEDICINE

## 2018-10-10 PROCEDURE — 96374 THER/PROPH/DIAG INJ IV PUSH: CPT

## 2018-10-10 PROCEDURE — 96361 HYDRATE IV INFUSION ADD-ON: CPT

## 2018-10-10 PROCEDURE — 74011000258 HC RX REV CODE- 258: Performed by: EMERGENCY MEDICINE

## 2018-10-10 PROCEDURE — 74011250636 HC RX REV CODE- 250/636: Performed by: EMERGENCY MEDICINE

## 2018-10-10 PROCEDURE — 99283 EMERGENCY DEPT VISIT LOW MDM: CPT

## 2018-10-10 PROCEDURE — 80053 COMPREHEN METABOLIC PANEL: CPT | Performed by: EMERGENCY MEDICINE

## 2018-10-10 PROCEDURE — 81001 URINALYSIS AUTO W/SCOPE: CPT | Performed by: EMERGENCY MEDICINE

## 2018-10-10 PROCEDURE — 96375 TX/PRO/DX INJ NEW DRUG ADDON: CPT

## 2018-10-10 PROCEDURE — 84703 CHORIONIC GONADOTROPIN ASSAY: CPT | Performed by: EMERGENCY MEDICINE

## 2018-10-10 PROCEDURE — 87081 CULTURE SCREEN ONLY: CPT | Performed by: EMERGENCY MEDICINE

## 2018-10-10 RX ORDER — BISMUTH SUBSALICYLATE 262 MG
1 TABLET,CHEWABLE ORAL DAILY
Status: ON HOLD | COMMUNITY
End: 2021-01-06

## 2018-10-10 RX ORDER — NITROFURANTOIN 25; 75 MG/1; MG/1
100 CAPSULE ORAL 2 TIMES DAILY
Qty: 10 CAP | Refills: 0 | Status: SHIPPED | OUTPATIENT
Start: 2018-10-10 | End: 2018-10-15

## 2018-10-10 RX ORDER — KETOROLAC TROMETHAMINE 30 MG/ML
15 INJECTION, SOLUTION INTRAMUSCULAR; INTRAVENOUS ONCE
Status: COMPLETED | OUTPATIENT
Start: 2018-10-10 | End: 2018-10-10

## 2018-10-10 RX ADMIN — SODIUM CHLORIDE 1000 ML: 900 INJECTION, SOLUTION INTRAVENOUS at 13:31

## 2018-10-10 RX ADMIN — PROMETHAZINE HYDROCHLORIDE 12.5 MG: 25 INJECTION INTRAMUSCULAR; INTRAVENOUS at 14:35

## 2018-10-10 RX ADMIN — KETOROLAC TROMETHAMINE 15 MG: 30 INJECTION, SOLUTION INTRAMUSCULAR at 13:31

## 2018-10-10 NOTE — LETTER
NOTIFICATION RETURN TO WORK / SCHOOL 
 
10/10/2018 4:11 PM 
 
Ms. Clemencia Araujo [address] 
[address] To Whom It May Concern: 
 
Clemencia Araujo is currently under the care of [id number] [facility] EMERGENCY DEPT. She will return to work on: 10/12/18 If there are questions or concerns please have the patient contact our office.  
 
 
 
Sincerely, 
 
 
Yusra Hamilton MD

## 2018-10-10 NOTE — DISCHARGE INSTRUCTIONS
IF YOU HAVE NEW OR WORSENING SYMPTOMS, SEVERE HEADACHE, CONFUSION, HIGH FEVER, PASSING OUT, VOMITING OR OTHER WORRYING SIGNS THEN RETURN TO THE ER RIGHT AWAY. AS WE DISCUSSED, YOUR BLOOD COUNT WAS LOW IN THE ER. YOU SHOULD TAKE YOUR IRON PILLS AS PRESCRIBED. FOLLOW-UP WITH YOUR DOCTOR TO BE RE-EVALUATED AND RE-CHECK YOUR BLOOD COUNTS. Anemia: Care Instructions  Your Care Instructions    Anemia is a low level of red blood cells, which carry oxygen throughout your body. Many things can cause anemia. Lack of iron is one of the most common causes. Your body needs iron to make hemoglobin, a substance in red blood cells that carries oxygen from the lungs to your body's cells. Without enough iron, the body produces fewer and smaller red blood cells. As a result, your body's cells do not get enough oxygen, and you feel tired and weak. And you may have trouble concentrating. Bleeding is the most common cause of a lack of iron. You may have heavy menstrual bleeding or bleeding caused by conditions such as ulcers, hemorrhoids, or cancer. Regular use of aspirin or other anti-inflammatory medicines (such as ibuprofen) also can cause bleeding in some people. A lack of iron in your diet also can cause anemia, especially at times when the body needs more iron, such as during pregnancy, infancy, and the teen years. Your doctor may have prescribed iron pills. It may take several months of treatment for your iron levels to return to normal. Your doctor also may suggest that you eat foods that are rich in iron, such as meat and beans. There are many other causes of anemia. It is not always due to a lack of iron. Finding the specific cause of your anemia will help your doctor find the right treatment for you. Follow-up care is a key part of your treatment and safety. Be sure to make and go to all appointments, and call your doctor if you are having problems.  It's also a good idea to know your test results and keep a list of the medicines you take. How can you care for yourself at home? · Take your medicines exactly as prescribed. Call your doctor if you think you are having a problem with your medicine. · If your doctor recommends iron pills, take them as directed:  ¨ Try to take the pills on an empty stomach about 1 hour before or 2 hours after meals. But you may need to take iron with food to avoid an upset stomach. ¨ Do not take antacids or drink milk or caffeine drinks (such as coffee, tea, or cola) at the same time or within 2 hours of the time that you take your iron. They can make it hard for your body to absorb the iron. ¨ Vitamin C (from food or supplements) helps your body absorb iron. Try taking iron pills with a glass of orange juice or some other food that is high in vitamin C, such as citrus fruits. ¨ Iron pills may cause stomach problems, such as heartburn, nausea, diarrhea, constipation, and cramps. Be sure to drink plenty of fluids, and include fruits, vegetables, and fiber in your diet each day. Iron pills often make your bowel movements dark or green. ¨ If you forget to take an iron pill, do not take a double dose of iron the next time you take a pill. ¨ Keep iron pills out of the reach of small children. An overdose of iron can be very dangerous. · Follow your doctor's advice about eating iron-rich foods. These include red meat, shellfish, poultry, eggs, beans, raisins, whole-grain bread, and leafy green vegetables. · Steam vegetables to help them keep their iron content. When should you call for help? Call 911 anytime you think you may need emergency care. For example, call if:    · You have symptoms of a heart attack. These may include:  ¨ Chest pain or pressure, or a strange feeling in the chest.  ¨ Sweating. ¨ Shortness of breath. ¨ Nausea or vomiting. ¨ Pain, pressure, or a strange feeling in the back, neck, jaw, or upper belly or in one or both shoulders or arms.   ¨ Lightheadedness or sudden weakness. ¨ A fast or irregular heartbeat. After you call 911, the  may tell you to chew 1 adult-strength or 2 to 4 low-dose aspirin. Wait for an ambulance. Do not try to drive yourself.     · You passed out (lost consciousness).    Call your doctor now or seek immediate medical care if:    · You have new or increased shortness of breath.     · You are dizzy or lightheaded, or you feel like you may faint.     · Your fatigue and weakness continue or get worse.     · You have any abnormal bleeding, such as:  ¨ Nosebleeds. ¨ Vaginal bleeding that is different (heavier, more frequent, at a different time of the month) than what you are used to. ¨ Bloody or black stools, or rectal bleeding. ¨ Bloody or pink urine.    Watch closely for changes in your health, and be sure to contact your doctor if:    · You do not get better as expected. Where can you learn more? Go to http://chitoHELIX BIOMEDIXjose manuel.info/. Enter R301 in the search box to learn more about \"Anemia: Care Instructions. \"  Current as of: May 7, 2018  Content Version: 11.8  © 3808-9014 [employer]. Care instructions adapted under license by [employer] (which disclaims liability or warranty for this information). If you have questions about a medical condition or this instruction, always ask your healthcare professional. [address] any warranty or liability for your use of this information. Sore Throat: Care Instructions  Your Care Instructions    Infection by bacteria or a virus causes most sore throats. Cigarette smoke, dry air, air pollution, allergies, and yelling can also cause a sore throat. Sore throats can be painful and annoying. Fortunately, most sore throats go away on their own. If you have a bacterial infection, your doctor may prescribe antibiotics. Follow-up care is a key part of your treatment and safety.  Be sure to make and go to all appointments, and call your doctor if you are having problems. It's also a good idea to know your test results and keep a list of the medicines you take. How can you care for yourself at home? · If your doctor prescribed antibiotics, take them as directed. Do not stop taking them just because you feel better. You need to take the full course of antibiotics. · Gargle with warm salt water once an hour to help reduce swelling and relieve discomfort. Use 1 teaspoon of salt mixed in 1 cup of warm water. · Take an over-the-counter pain medicine, such as acetaminophen (Tylenol), ibuprofen (Advil, Motrin), or naproxen (Aleve). Read and follow all instructions on the label. · Be careful when taking over-the-counter cold or flu medicines and Tylenol at the same time. Many of these medicines have acetaminophen, which is Tylenol. Read the labels to make sure that you are not taking more than the recommended dose. Too much acetaminophen (Tylenol) can be harmful. · Drink plenty of fluids. Fluids may help soothe an irritated throat. Hot fluids, such as tea or soup, may help decrease throat pain. · Use over-the-counter throat lozenges to soothe pain. Regular cough drops or hard candy may also help. These should not be given to young children because of the risk of choking. · Do not smoke or allow others to smoke around you. If you need help quitting, talk to your doctor about stop-smoking programs and medicines. These can increase your chances of quitting for good. · Use a vaporizer or humidifier to add moisture to your bedroom. Follow the directions for cleaning the machine. When should you call for help? Call your doctor now or seek immediate medical care if:    · You have new or worse trouble swallowing.     · Your sore throat gets much worse on one side.    Watch closely for changes in your health, and be sure to contact your doctor if you do not get better as expected. Where can you learn more?   Go to http://chito-jose manuel.info/. Enter 062 [id number] 80 [id number] in the search box to learn more about \"Sore Throat: Care Instructions. \"  Current as of: March 28, 2018  Content Version: 11.8  © 1655-8624 [employer]. Care instructions adapted under license by [employer] (which disclaims liability or warranty for this information). If you have questions about a medical condition or this instruction, always ask your healthcare professional. [place] [address] any warranty or liability for your use of this information. Urinary Tract Infection in Women: Care Instructions  Your Care Instructions    A urinary tract infection, or UTI, is a general term for an infection anywhere between the kidneys and the urethra (where urine comes out). Most UTIs are bladder infections. They often cause pain or burning when you urinate. UTIs are caused by bacteria and can be cured with antibiotics. Be sure to complete your treatment so that the infection goes away. Follow-up care is a key part of your treatment and safety. Be sure to make and go to all appointments, and call your doctor if you are having problems. It's also a good idea to know your test results and keep a list of the medicines you take. How can you care for yourself at home? · Take your antibiotics as directed. Do not stop taking them just because you feel better. You need to take the full course of antibiotics. · Drink extra water and other fluids for the next day or two. This may help wash out the bacteria that are causing the infection. (If you have kidney, heart, or liver disease and have to limit fluids, talk with your doctor before you increase your fluid intake.)  · Avoid drinks that are carbonated or have caffeine. They can irritate the bladder. · Urinate often. Try to empty your bladder each time. · To relieve pain, take a hot bath or lay a heating pad set on low over your lower belly or genital area.  Never go to sleep with a heating pad in place. To prevent UTIs  · Drink plenty of water each day. This helps you urinate often, which clears bacteria from your system. (If you have kidney, heart, or liver disease and have to limit fluids, talk with your doctor before you increase your fluid intake.)  · Urinate when you need to. · Urinate right after you have sex. · Change sanitary pads often. · Avoid douches, bubble baths, feminine hygiene sprays, and other feminine hygiene products that have deodorants. · After going to the bathroom, wipe from front to back. When should you call for help? Call your doctor now or seek immediate medical care if:    · Symptoms such as fever, chills, nausea, or vomiting get worse or appear for the first time.     · You have new pain in your back just below your rib cage. This is called flank pain.     · There is new blood or pus in your urine.     · You have any problems with your antibiotic medicine.    Watch closely for changes in your health, and be sure to contact your doctor if:    · You are not getting better after taking an antibiotic for 2 days.     · Your symptoms go away but then come back. Where can you learn more? Go to http://chito-jose manuel.info/. Enter A440 in the search box to learn more about \"Urinary Tract Infection in Women: Care Instructions. \"  Current as of: March 21, 2018  Content Version: 11.8  © 2975-5557 [employer]. Care instructions adapted under license by [employer] (which disclaims liability or warranty for this information). If you have questions about a medical condition or this instruction, always ask your healthcare professional. [address] any warranty or liability for your use of this information.

## 2018-10-10 NOTE — ED PROVIDER NOTES
EMERGENCY DEPARTMENT HISTORY AND PHYSICAL EXAM 
 
1:00 PM 
 
 
Date: 10/10/2018 Patient Name: Julio Montilla History of Presenting Illness Chief Complaint Patient presents with  
 Headache  Neck Pain History Provided By: Patient Chief Complaint: HA 
Duration:  10 hours Timing:  Constant Location: Diffuse head Quality: aching Severity: moderate Modifying Factors: No modifying or aggravating factors were reported. Associated Symptoms:  fever, chills, diaphoresis, sore throat, and congestion. Denies vomiting, dysuria, coughing, and frequency. Additional History (Context): 1:00 PM Julio Montilla is a 34 y.o. female with h/o anemia, enlarged uterus, bronchitis, bipolar disorder, depression, and PTSD who presents to ED complaining of 2 days of sore throat, nasal congestion, intermittent headache, neck pain, and myalgias. Other assoc symptoms include subj fever. Denies vomiting, dysuria, coughing, and frequency. Reports that sore throat and congestion began 1 day ago and that the other Sx developed spontaneously about 10 hours ago. Has a sick contact in her daughter who has had URI symptoms. 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 No recent travel reported. Reports the discovery of an enlarged uterus. Denies any further complaints or symptoms at the moment. PCP: None Current Outpatient Prescriptions Medication Sig Dispense Refill  multivitamin (ONE A DAY) tablet Take 1 Tab by mouth daily.  nitrofurantoin, macrocrystal-monohydrate, (MACROBID) 100 mg capsule Take 1 Cap by mouth two (2) times a day for 5 days. 10 Cap 0 Past History Past Medical History: 
Past Medical History:  
Diagnosis Date  Anemia NEC  Bronchitis  H/O hernia repair  Psychiatric disorder   
 bipolar, depression, PTSD Past Surgical History: 
Past Surgical History:  
Procedure Laterality Date  ABDOMEN SURGERY PROC UNLISTED    
 hernia   DELIVERY ONLY    
 4 previous  HX OTHER SURGICAL Family History: 
History reviewed. No pertinent family history. Social History: 
Social History Substance Use Topics  Smoking status: Current Every Day Smoker Packs/day: 0.25  Smokeless tobacco: Never Used  Alcohol use Yes Comment: social  
 
 
Allergies: Allergies Allergen Reactions  Percocet [Oxycodone-Acetaminophen] Itching Pt denies allergy  Zofran Odt [Ondansetron] Hives Review of Systems Review of Systems Constitutional: Positive for chills, diaphoresis and fever. Negative for fatigue. HENT: Positive for congestion and sore throat. Negative for ear pain and rhinorrhea. Eyes: Negative for pain, redness and itching. Respiratory: Negative for cough, chest tightness and shortness of breath. Cardiovascular: Negative for chest pain, palpitations and leg swelling. Gastrointestinal: Negative for abdominal pain, diarrhea, nausea and vomiting. Genitourinary: Negative for decreased urine volume, dysuria, flank pain, frequency, hematuria and pelvic pain. Musculoskeletal: Positive for myalgias and neck pain. Negative for arthralgias, back pain and joint swelling. Skin: Negative for color change, pallor and rash. Neurological: Positive for headaches. Negative for dizziness and weakness. Hematological: Negative for adenopathy. Does not bruise/bleed easily. Physical Exam  
 
Visit Vitals  /74 (BP 1 Location: Left arm, BP Patient Position: At rest)  Pulse 69  Temp 99.3 °F (37.4 °C)  Resp 16  
 Ht 5' (1.524 m)  Wt 70.3 kg (155 lb)  LMP 10/10/2018  SpO2 100%  Breastfeeding No  
 BMI 30.27 kg/m2 Physical Exam  
Constitutional: No distress. HENT:  
Head: Normocephalic and atraumatic.   
Mouth/Throat: Oropharynx is clear and moist.  
Eyes: Conjunctivae and EOM are normal. Pupils are equal, round, and reactive to light. Neck: Normal range of motion and full passive range of motion without pain. Neck supple. No rigidity. Normal range of motion present. No Brudzinski's sign and no Kernig's sign noted. Cardiovascular: Normal rate, regular rhythm and normal heart sounds. No murmur heard. Pulmonary/Chest: Effort normal and breath sounds normal. She has no wheezes. She has no rales. Abdominal: Soft. Bowel sounds are normal. She exhibits no distension. There is no tenderness. Musculoskeletal: Normal range of motion. She exhibits no edema or deformity. Lymphadenopathy:  
  She has no cervical adenopathy. Neurological: She is alert. She exhibits normal muscle tone. Coordination normal.  
Skin: Skin is warm and dry. No rash noted. She is not diaphoretic. No erythema. Psychiatric: She has a normal mood and affect. Her behavior is normal.  
 
 
 
Diagnostic Study Results Labs - Recent Results (from the past 12 hour(s)) URINALYSIS W/ RFLX MICROSCOPIC Collection Time: 10/10/18  1:15 PM  
Result Value Ref Range Color YELLOW Appearance CLEAR Specific gravity >1.030 (H) 1.005 - 1.030  
 pH (UA) 5.5 5.0 - 8.0 Protein TRACE (A) NEG mg/dL Glucose NEGATIVE  NEG mg/dL Ketone TRACE (A) NEG mg/dL Bilirubin NEGATIVE  NEG Blood LARGE (A) NEG Urobilinogen 1.0 0.2 - 1.0 EU/dL Nitrites NEGATIVE  NEG Leukocyte Esterase SMALL (A) NEG URINE MICROSCOPIC ONLY Collection Time: 10/10/18  1:15 PM  
Result Value Ref Range WBC 4 to 10 0 - 4 /hpf  
 RBC 11 to 20 0 - 5 /hpf Epithelial cells 1+ 0 - 5 /lpf Bacteria 2+ (A) NEG /hpf Mucus 2+ (A) NEG /lpf  
 CA Oxalate crystals 1+ (A) NEG  
CBC WITH AUTOMATED DIFF Collection Time: 10/10/18  1:21 PM  
Result Value Ref Range WBC 6.2 4.6 - 13.2 K/uL  
 RBC 4.44 4.20 - 5.30 M/uL HGB 8.4 (L) 12.0 - 16.0 g/dL HCT 28.6 (L) 35.0 - 45.0 %  MCV 64.4 (L) 74.0 - 97.0 FL  
 MCH 18.9 (L) 24.0 - 34.0 PG  
 MCHC 29.4 (L) 31.0 - 37.0 g/dL  
 RDW 18.4 (H) 11.6 - 14.5 % PLATELET 087 932 - 923 K/uL MPV 10.5 9.2 - 11.8 FL  
 NEUTROPHILS 49 40 - 73 % LYMPHOCYTES 38 21 - 52 % MONOCYTES 11 (H) 3 - 10 % EOSINOPHILS 2 0 - 5 % BASOPHILS 0 0 - 2 %  
 ABS. NEUTROPHILS 3.0 1.8 - 8.0 K/UL  
 ABS. LYMPHOCYTES 2.4 0.9 - 3.6 K/UL  
 ABS. MONOCYTES 0.7 0.05 - 1.2 K/UL  
 ABS. EOSINOPHILS 0.1 0.0 - 0.4 K/UL  
 ABS. BASOPHILS 0.0 0.0 - 0.1 K/UL  
 DF AUTOMATED PLATELET COMMENTS ADEQUATE PLATELETS    
 RBC COMMENTS ANISOCYTOSIS 1+ 
    
 RBC COMMENTS HYPOCHROMIA 2+ METABOLIC PANEL, COMPREHENSIVE Collection Time: 10/10/18  1:21 PM  
Result Value Ref Range Sodium 141 136 - 145 mmol/L Potassium 3.4 (L) 3.5 - 5.5 mmol/L Chloride 108 100 - 108 mmol/L  
 CO2 29 21 - 32 mmol/L Anion gap 4 3.0 - 18 mmol/L Glucose 90 74 - 99 mg/dL BUN 8 7.0 - 18 MG/DL Creatinine 0.62 0.6 - 1.3 MG/DL  
 BUN/Creatinine ratio 13 12 - 20 GFR est AA >60 >60 ml/min/1.73m2 GFR est non-AA >60 >60 ml/min/1.73m2 Calcium 8.4 (L) 8.5 - 10.1 MG/DL Bilirubin, total 0.1 (L) 0.2 - 1.0 MG/DL  
 ALT (SGPT) 22 13 - 56 U/L  
 AST (SGOT) 21 15 - 37 U/L Alk. phosphatase 36 (L) 45 - 117 U/L Protein, total 7.0 6.4 - 8.2 g/dL Albumin 3.3 (L) 3.4 - 5.0 g/dL Globulin 3.7 2.0 - 4.0 g/dL A-G Ratio 0.9 0.8 - 1.7 HCG QL SERUM Collection Time: 10/10/18  1:21 PM  
Result Value Ref Range HCG, Ql. NEGATIVE  NEG    
STREP THROAT SCREEN Collection Time: 10/10/18  2:28 PM  
Result Value Ref Range Special Requests: NO SPECIAL REQUESTS Strep Screen NEGATIVE Culture result: PENDING Radiologic Studies - No orders to display No results found. Medical Decision Making I am the first provider for this patient. I reviewed the vital signs, available nursing notes, past medical history, past surgical history, family history and social history. Vital Signs-Reviewed the patient's vital signs. Pulse Oximetry Analysis -  Patient is 100% O2 on RA, indicating adequate oxygenation. Cardiac Monitor: 
Rate: 69 bpm 
 
Records Reviewed: Old medical records and nursing notes (Time of Review: 1:00 PM) ED Course: Progress Notes, Reevaluation, and Consults: 
ED Course 4:06 PM 
Patient feeling better, requesting discharge. No fever, leukocytosis, or meningismus. No signs of meningitis. Likely acute viral pharyngitis. Possible UTI; will treat. Noted anemia -- reports she has chronic anemia and is supposed to be on iron supplements, which she has at home, but has not been taking. Will resume taking these. Also likely acutely lower due to start of menstrual cycle yesterday. Advised on return precautions. Provider Notes (Medical Decision Making):  
 
 
Diagnosis Clinical Impression: 1. Acute pharyngitis, unspecified etiology 2. Anemia, unspecified type 3. Urinary tract infection without hematuria, site unspecified Disposition: DC Follow-up Information Follow up With Details Comments Contact Info Your Primary Care Doctor or OBGYN In 5 days Re-evaluation and re-check your blood count [id number] [facility] EMERGENCY DEPT  If symptoms worsen [address] [address] [phone] Patient's Medications Start Taking NITROFURANTOIN, MACROCRYSTAL-MONOHYDRATE, (MACROBID) 100 MG CAPSULE    Take 1 Cap by mouth two (2) times a day for 5 days. Continue Taking MULTIVITAMIN (ONE A DAY) TABLET    Take 1 Tab by mouth daily. These Medications have changed No medications on file Stop Taking FERROUS SULFATE 325 MG (65 MG IRON) TABLET    Take 1 Tab by mouth Daily (before breakfast). HYDROCODONE-ACETAMINOPHEN (NORCO) 5-325 MG PER TABLET    Take 1 Tab by mouth every four (4) hours as needed for Pain. Max Daily Amount: 6 Tabs.   
 SODIUM CHLORIDE (SALINE MIST) 0.65 % NASAL SPRAY    1 [place] by Both Nostrils route as needed for Congestion. Indications: Nasal Congestion  
 
_______________________________ Attestations: 
Scribe Attestation Juwan Harrell acting as a scribe for and in the presence of Lillian Szymanski MD     
October 10, 2018 at 1:00 PM 
    
Provider Attestation:     
I personally performed the services described in the documentation, reviewed the documentation, as recorded by the scribe in my presence, and it accurately and completely records my words and actions. October 10, 2018 at 1:00 PM - Lillian Szymanski MD   
_______________________________

## 2018-10-10 NOTE — ED TRIAGE NOTES
Patient states onset of neck stiffness and headache yesterday. States experiencing hot and cold episodes at 0300 today. She states worsening of symptoms. Denies taking any medications today.

## 2018-10-11 LAB
BACTERIA SPEC CULT: NORMAL
SERVICE CMNT-IMP: NORMAL

## 2018-10-12 LAB
B-HEM STREP THROAT QL CULT: NEGATIVE
BACTERIA SPEC CULT: NORMAL
SERVICE CMNT-IMP: NORMAL

## 2019-01-17 ENCOUNTER — HOSPITAL ENCOUNTER (EMERGENCY)
Age: 34
Discharge: HOME | End: 2019-01-17
Attending: EMERGENCY MEDICINE
Payer: MEDICAID

## 2019-01-17 VITALS
DIASTOLIC BLOOD PRESSURE: 89 MMHG | BODY MASS INDEX: 29.45 KG/M2 | OXYGEN SATURATION: 98 % | HEART RATE: 96 BPM | SYSTOLIC BLOOD PRESSURE: 129 MMHG | WEIGHT: 150 LBS | RESPIRATION RATE: 18 BRPM | HEIGHT: 60 IN | TEMPERATURE: 99.5 F

## 2019-01-17 DIAGNOSIS — J06.9 VIRAL URI: Primary | ICD-10-CM

## 2019-01-17 LAB
FLUAV AG NPH QL IA: NEGATIVE
FLUBV AG NOSE QL IA: NEGATIVE

## 2019-01-17 PROCEDURE — 87804 INFLUENZA ASSAY W/OPTIC: CPT

## 2019-01-17 PROCEDURE — 99283 EMERGENCY DEPT VISIT LOW MDM: CPT

## 2019-01-17 RX ORDER — IBUPROFEN 800 MG/1
800 TABLET ORAL
Qty: 20 TAB | Refills: 0 | Status: SHIPPED | OUTPATIENT
Start: 2019-01-17 | End: 2019-01-24

## 2019-01-17 RX ORDER — ESCITALOPRAM OXALATE 20 MG/1
20 TABLET ORAL DAILY
Status: ON HOLD | COMMUNITY
End: 2021-01-06

## 2019-01-17 RX ORDER — OXYCODONE AND ACETAMINOPHEN 5; 325 MG/1; MG/1
TABLET ORAL
Status: ON HOLD | COMMUNITY
End: 2021-01-06

## 2019-01-17 NOTE — ED PROVIDER NOTES
EMERGENCY DEPARTMENT HISTORY AND PHYSICAL EXAM 
 
3:12 PM 
 
 
Date: 1/17/2019 Patient Name: Isaac Pitts History of Presenting Illness Chief Complaint Patient presents with  Chills  Headache  Nasal Congestion  Sore Throat History Provided By: Patient Chief Complaint: sore throat, nasal congestion, body aches, HA, neck pain Duration: 12 Hours Timing:  Acute Location:  
Quality: Aching Severity: 7 out of 10 Modifying Factors: improved with percocet and phenergan Associated Symptoms: denies any other associated signs or symptoms Additional History (Context):Christie Bejarano is a 34 y.o. female who presents to the emergency department for evaluation of nasal congestion, sore throat, HA, body aches, and left sided neck pain since last night. No ill contacts. No fevers. She has been taking percocet and phenergan with some relief in symptoms. Pt denies any chills, dizziness or light headedness, CP or discomfort, SOB, cough, n/v/d/c, abd pain, back pain, dysuria, hematuria, frequency, focal weakness/numbness/tingling, possibility of pregnancy, or rash. Patient has no other complaints at this time. PCP:  None Current Outpatient Medications Medication Sig Dispense Refill  oxyCODONE-acetaminophen (PERCOCET) 5-325 mg per tablet Take  by mouth every four (4) hours as needed for Pain.  escitalopram oxalate (LEXAPRO) 20 mg tablet Take 20 mg by mouth daily.  ibuprofen (MOTRIN) 800 mg tablet Take 1 Tab by mouth every six (6) hours as needed for Pain for up to 7 days. 20 Tab 0  
 multivitamin (ONE A DAY) tablet Take 1 Tab by mouth daily. Past History Past Medical History: 
Past Medical History:  
Diagnosis Date  Anemia NEC  Bronchitis History of bronchitis  H/O hernia repair  Heavy periods  Pelvic pain  Psychiatric disorder   
 bipolar, depression, PTSD  Uterine polyp Past Surgical History: Past Surgical History:  
Procedure Laterality Date  ABDOMEN SURGERY PROC UNLISTED    
 hernia X2  
  DELIVERY ONLY 6 previous  HX DILATION AND CURETTAGE    
 HX TUBAL LIGATION Family History: 
History reviewed. No pertinent family history. Social History: 
Social History Tobacco Use  Smoking status: Current Some Day Smoker Packs/day: 0.25  Smokeless tobacco: Never Used  Tobacco comment: patient states has cut back on smoking Substance Use Topics  Alcohol use: Yes Comment: occasionally social   
 Drug use: No  
 
 
Allergies: Allergies Allergen Reactions  Zofran Odt [Ondansetron] Hives Review of Systems Review of Systems Constitutional: Negative for chills and fever. HENT: Positive for congestion and sore throat. Negative for rhinorrhea. Respiratory: Negative for cough and shortness of breath. Cardiovascular: Negative for chest pain. Gastrointestinal: Negative for abdominal pain, blood in stool, constipation, diarrhea, nausea and vomiting. Genitourinary: Negative for dysuria, frequency and hematuria. Musculoskeletal: Positive for myalgias. Negative for back pain. Skin: Negative for rash and wound. Neurological: Positive for headaches. Negative for dizziness. Physical Exam  
 
Visit Vitals /89 Pulse 96 Temp 99.5 °F (37.5 °C) Resp 18 Ht 5' (1.524 m) Wt 68 kg (150 lb) LMP 2019 SpO2 98% Breastfeeding? No  
BMI 29.29 kg/m² Physical Exam  
Constitutional: She is oriented to person, place, and time. She appears well-developed and well-nourished. No distress. HENT:  
Head: Normocephalic and atraumatic. Nose: Mucosal edema and rhinorrhea present. Mouth/Throat: Uvula is midline and mucous membranes are normal. Posterior oropharyngeal erythema present. No oropharyngeal exudate, posterior oropharyngeal edema or tonsillar abscesses.   
Eyes: Conjunctivae are normal.  
 Neck: Normal range of motion. Neck supple. No thyromegaly present. Cardiovascular: Normal rate, regular rhythm and normal heart sounds. Pulmonary/Chest: Effort normal and breath sounds normal. No respiratory distress. She has no wheezes. She has no rales. She exhibits no tenderness. Lungs CTAB Musculoskeletal: She exhibits no edema or deformity. Lymphadenopathy:  
  She has cervical adenopathy. Neurological: She is alert and oriented to person, place, and time. She has normal reflexes. Skin: Skin is warm and dry. She is not diaphoretic. Psychiatric: She has a normal mood and affect. Nursing note and vitals reviewed. Diagnostic Study Results Labs - Recent Results (from the past 12 hour(s)) INFLUENZA A & B AG (RAPID TEST) Collection Time: 01/17/19  3:19 PM  
Result Value Ref Range Influenza A Antigen NEGATIVE  NEG Influenza B Antigen NEGATIVE  NEG Radiologic Studies - No results found. Medical Decision Making I am the first provider for this patient. I reviewed the vital signs, available nursing notes, past medical history, past surgical history, family history and social history. Vital Signs-Reviewed the patient's vital signs. Pulse Oximetry Analysis -  98% on room air (Interpretation) Records Reviewed: Nursing Notes and Old Medical Records (Time of Review: 3:12 PM) 
 
ED Course: Progress Notes, Reevaluation, and Consults: 
 
Provider Notes (Medical Decision Making):  
Differential Diagnosis:  influenza, mononucleosis, acute bronchitis, URI, streptococcal pharyngitis, pertussis, pneumonia, asthma exacerbation, allergic rhinitis Plan: Pt presents ambulatory, well-hydrated, non-toxic in appearance. Vitals wnl. Exam and HPI c/w viral etiology. Flu negative. Pt advised on supportive care. rx for motrin. At this time, patient is stable and appropriate for discharge home.   Patient demonstrates understanding of current diagnoses and is in agreement with the treatment plan. They are advised that while the likelihood of serious underlying condition is low at this point given the evaluation performed today, we cannot fully rule it out. They are advised to immediately return with any new symptoms or worsening of current condition. All questions have been answered. Patient is given educational material regarding their diagnoses, including danger symptoms and when to return to the ED. Follow-up with PCP. Diagnosis Clinical Impression: 1. Viral URI Disposition: [address] [place] Follow-up Information Follow up With Specialties Details Why Contact Info [address]  Call in 2 days For follow-up [address] [facility] [address] [phone] [address] [facility] EMERGENCY DEPT Emergency Medicine Go to As needed, If symptoms worsen [address] [place] [address] [phone] Medication List  
  
START taking these medications   
ibuprofen 800 mg tablet Commonly known as:  MOTRIN Take 1 Tab by mouth every six (6) hours as needed for Pain for up to 7 days. CONTINUE taking these medications LEXAPRO 20 mg tablet Generic drug:  escitalopram oxalate 
  
multivitamin tablet Commonly known as:  ONE A DAY PERCOCET 5-325 mg per tablet Generic drug:  oxyCODONE-acetaminophen Where to Get Your Medications Information about where to get these medications is not yet available Ask your nurse or doctor about these medications · ibuprofen 800 mg tablet 
  
 
_______________________________

## 2019-01-17 NOTE — LETTER
NOTIFICATION OF RETURN TO WORK 
 
1/17/2019 5:02 PM 
 
Ms. Ferris Boast [address] 
[facility] [address] To Whom It May Concern: 
 
Ferris Boast was under the care of [id number] [facility] EMERGENCY DEPT. She will be able to return to work on Saturday, 1/19/19. If there are questions or concerns please have the patient contact our office. Sincerely, Maximo Patel PA-C

## 2019-01-17 NOTE — ED TRIAGE NOTES
Patient states onset of nasal congestion, headache, sore throat, and body aches. States taking percocet and phenergan at 0730 this morning.

## 2019-01-17 NOTE — ED NOTES
Patient armband removed and shreddedI have reviewed discharge instructions with the patient. The patient verbalized understanding.  rx x 1 and work note given;

## 2019-01-17 NOTE — DISCHARGE INSTRUCTIONS
Patient Education     Please return immediately to the Emergency Room for re-evaluation if you are not improving, develop any new symptoms, or develop worsening of current symptoms! If you have been prescribed a medication and are unable to take this medication for any reason, please return to the Emergency Department for further evaluation! If you have been referred for follow-up to a specialist, but are unable to follow-up and your symptoms are either not improving or are worsening, please return to the Emergency Department for further evaluation! Upper Respiratory Infection (Cold): Care Instructions  Your Care Instructions    An upper respiratory infection, or URI, is an infection of the nose, sinuses, or throat. URIs are spread by coughs, sneezes, and direct contact. The common cold is the most frequent kind of URI. The flu and sinus infections are other kinds of URIs. Almost all URIs are caused by viruses. Antibiotics won't cure them. But you can treat most infections with home care. This may include drinking lots of fluids and taking over-the-counter pain medicine. You will probably feel better in 4 to 10 days. The doctor has checked you carefully, but problems can develop later. If you notice any problems or new symptoms, get medical treatment right away. Follow-up care is a key part of your treatment and safety. Be sure to make and go to all appointments, and call your doctor if you are having problems. It's also a good idea to know your test results and keep a list of the medicines you take. How can you care for yourself at home? · To prevent dehydration, drink plenty of fluids, enough so that your urine is light yellow or clear like water. Choose water and other caffeine-free clear liquids until you feel better. If you have kidney, heart, or liver disease and have to limit fluids, talk with your doctor before you increase the amount of fluids you drink.   · Take an over-the-counter pain medicine, such as acetaminophen (Tylenol), ibuprofen (Advil, Motrin), or naproxen (Aleve). Read and follow all instructions on the label. · Before you use cough and cold medicines, check the label. These medicines may not be safe for young children or for people with certain health problems. · Be careful when taking over-the-counter cold or flu medicines and Tylenol at the same time. Many of these medicines have acetaminophen, which is Tylenol. Read the labels to make sure that you are not taking more than the recommended dose. Too much acetaminophen (Tylenol) can be harmful. · Get plenty of rest.  · Do not smoke or allow others to smoke around you. If you need help quitting, talk to your doctor about stop-smoking programs and medicines. These can increase your chances of quitting for good. When should you call for help? Call 911 anytime you think you may need emergency care. For example, call if:    · You have severe trouble breathing.    Call your doctor now or seek immediate medical care if:    · You seem to be getting much sicker.     · You have new or worse trouble breathing.     · You have a new or higher fever.     · You have a new rash.    Watch closely for changes in your health, and be sure to contact your doctor if:    · You have a new symptom, such as a sore throat, an earache, or sinus pain.     · You cough more deeply or more often, especially if you notice more mucus or a change in the color of your mucus.     · You do not get better as expected. Where can you learn more? Go to http://chito-jose manuel.info/. Enter A680 in the search box to learn more about \"Upper Respiratory Infection (Cold): Care Instructions. \"  Current as of: December 6, 2017  Content Version: 11.8  © 4600-4192 [employer]. Care instructions adapted under license by [employer] (which disclaims liability or warranty for this information).  If you have questions about a medical condition or this instruction, always ask your healthcare professional. [place] [address] any warranty or liability for your use of this information.

## 2019-06-19 ENCOUNTER — HOSPITAL ENCOUNTER (OUTPATIENT)
Dept: ULTRASOUND IMAGING | Age: 34
Discharge: HOME | End: 2019-06-19
Attending: SURGERY
Payer: MEDICAID

## 2019-06-19 DIAGNOSIS — R10.10 UPPER ABDOMINAL PAIN: ICD-10-CM

## 2019-06-19 PROCEDURE — 76705 ECHO EXAM OF ABDOMEN: CPT

## 2019-07-17 ENCOUNTER — HOSPITAL ENCOUNTER (OUTPATIENT)
Dept: NUCLEAR MEDICINE | Age: 34
Discharge: HOME | End: 2019-07-17
Attending: SURGERY
Payer: MEDICAID

## 2019-07-17 VITALS — BODY MASS INDEX: 27.34 KG/M2 | WEIGHT: 140 LBS

## 2019-07-17 DIAGNOSIS — R10.0 ACUTE ABDOMEN: ICD-10-CM

## 2019-07-17 PROCEDURE — 74011250636 HC RX REV CODE- 250/636: Performed by: SURGERY

## 2019-07-17 PROCEDURE — 74011000258 HC RX REV CODE- 258: Performed by: SURGERY

## 2019-07-17 PROCEDURE — 78227 HEPATOBIL SYST IMAGE W/DRUG: CPT

## 2019-07-17 RX ORDER — SODIUM CHLORIDE 9 MG/ML
50 INJECTION, SOLUTION INTRAVENOUS
Status: COMPLETED | OUTPATIENT
Start: 2019-07-17 | End: 2019-07-17

## 2019-07-17 RX ADMIN — SODIUM CHLORIDE 50 ML/HR: 900 INJECTION, SOLUTION INTRAVENOUS at 10:31

## 2019-07-17 RX ADMIN — SINCALIDE 1.3 MCG: 5 INJECTION, POWDER, LYOPHILIZED, FOR SOLUTION INTRAVENOUS at 10:30

## 2019-08-01 PROCEDURE — 99283 EMERGENCY DEPT VISIT LOW MDM: CPT

## 2019-08-02 ENCOUNTER — HOSPITAL ENCOUNTER (EMERGENCY)
Age: 34
Discharge: HOME | End: 2019-08-02
Attending: EMERGENCY MEDICINE
Payer: MEDICAID

## 2019-08-02 ENCOUNTER — APPOINTMENT (OUTPATIENT)
Dept: GENERAL RADIOLOGY | Age: 34
End: 2019-08-02
Attending: EMERGENCY MEDICINE
Payer: MEDICAID

## 2019-08-02 ENCOUNTER — APPOINTMENT (OUTPATIENT)
Dept: CT IMAGING | Age: 34
End: 2019-08-02
Attending: EMERGENCY MEDICINE
Payer: MEDICAID

## 2019-08-02 VITALS
HEART RATE: 90 BPM | DIASTOLIC BLOOD PRESSURE: 83 MMHG | OXYGEN SATURATION: 100 % | TEMPERATURE: 97.8 F | RESPIRATION RATE: 16 BRPM | SYSTOLIC BLOOD PRESSURE: 130 MMHG

## 2019-08-02 DIAGNOSIS — M54.32 BILATERAL SCIATICA: Primary | ICD-10-CM

## 2019-08-02 DIAGNOSIS — M54.31 BILATERAL SCIATICA: Primary | ICD-10-CM

## 2019-08-02 DIAGNOSIS — D50.0 IRON DEFICIENCY ANEMIA DUE TO CHRONIC BLOOD LOSS: ICD-10-CM

## 2019-08-02 LAB
ALBUMIN SERPL-MCNC: 3.4 G/DL (ref 3.4–5)
ALBUMIN/GLOB SERPL: 0.9 {RATIO} (ref 0.8–1.7)
ALP SERPL-CCNC: 38 U/L (ref 45–117)
ALT SERPL-CCNC: 19 U/L (ref 13–56)
ANION GAP SERPL CALC-SCNC: 8 MMOL/L (ref 3–18)
AST SERPL-CCNC: 12 U/L (ref 10–38)
BASOPHILS # BLD: 0.1 K/UL (ref 0–0.1)
BASOPHILS NFR BLD: 1 % (ref 0–2)
BILIRUB SERPL-MCNC: <0.1 MG/DL (ref 0.2–1)
BUN SERPL-MCNC: 10 MG/DL (ref 7–18)
BUN/CREAT SERPL: 14 (ref 12–20)
CALCIUM SERPL-MCNC: 8.9 MG/DL (ref 8.5–10.1)
CHLORIDE SERPL-SCNC: 111 MMOL/L (ref 100–111)
CO2 SERPL-SCNC: 25 MMOL/L (ref 21–32)
CREAT SERPL-MCNC: 0.7 MG/DL (ref 0.6–1.3)
DIFFERENTIAL METHOD BLD: ABNORMAL
EOSINOPHIL # BLD: 0.2 K/UL (ref 0–0.4)
EOSINOPHIL NFR BLD: 2 % (ref 0–5)
ERYTHROCYTE [DISTWIDTH] IN BLOOD BY AUTOMATED COUNT: 20.4 % (ref 11.6–14.5)
GLOBULIN SER CALC-MCNC: 4 G/DL (ref 2–4)
GLUCOSE SERPL-MCNC: 101 MG/DL (ref 74–99)
HCG SERPL QL: NEGATIVE
HCT VFR BLD AUTO: 24.5 % (ref 35–45)
HGB BLD-MCNC: 6.9 G/DL (ref 12–16)
LIPASE SERPL-CCNC: 91 U/L (ref 73–393)
LYMPHOCYTES # BLD: 3.5 K/UL (ref 0.9–3.6)
LYMPHOCYTES NFR BLD: 46 % (ref 21–52)
MCH RBC QN AUTO: 16.8 PG (ref 24–34)
MCHC RBC AUTO-ENTMCNC: 28.2 G/DL (ref 31–37)
MCV RBC AUTO: 59.6 FL (ref 74–97)
MONOCYTES # BLD: 0.8 K/UL (ref 0.05–1.2)
MONOCYTES NFR BLD: 10 % (ref 3–10)
NEUTS SEG # BLD: 3.2 K/UL (ref 1.8–8)
NEUTS SEG NFR BLD: 41 % (ref 40–73)
PLATELET # BLD AUTO: 193 K/UL (ref 135–420)
PLATELET COMMENTS,PCOM: ABNORMAL
POTASSIUM SERPL-SCNC: 3.5 MMOL/L (ref 3.5–5.5)
PROT SERPL-MCNC: 7.4 G/DL (ref 6.4–8.2)
RBC # BLD AUTO: 4.11 M/UL (ref 4.2–5.3)
RBC MORPH BLD: ABNORMAL
SODIUM SERPL-SCNC: 144 MMOL/L (ref 136–145)
WBC # BLD AUTO: 7.8 K/UL (ref 4.6–13.2)

## 2019-08-02 PROCEDURE — 74011250636 HC RX REV CODE- 250/636: Performed by: EMERGENCY MEDICINE

## 2019-08-02 PROCEDURE — 96360 HYDRATION IV INFUSION INIT: CPT

## 2019-08-02 PROCEDURE — 74011250637 HC RX REV CODE- 250/637: Performed by: EMERGENCY MEDICINE

## 2019-08-02 PROCEDURE — 85025 COMPLETE CBC W/AUTO DIFF WBC: CPT

## 2019-08-02 PROCEDURE — 83690 ASSAY OF LIPASE: CPT

## 2019-08-02 PROCEDURE — 80053 COMPREHEN METABOLIC PANEL: CPT

## 2019-08-02 PROCEDURE — 74177 CT ABD & PELVIS W/CONTRAST: CPT

## 2019-08-02 PROCEDURE — 84703 CHORIONIC GONADOTROPIN ASSAY: CPT

## 2019-08-02 PROCEDURE — 74011636320 HC RX REV CODE- 636/320: Performed by: EMERGENCY MEDICINE

## 2019-08-02 RX ORDER — TRAMADOL HYDROCHLORIDE 50 MG/1
50 TABLET ORAL
Qty: 12 TAB | Refills: 0 | Status: SHIPPED | OUTPATIENT
Start: 2019-08-02 | End: 2019-08-05

## 2019-08-02 RX ORDER — TRAMADOL HYDROCHLORIDE 50 MG/1
50 TABLET ORAL
Status: COMPLETED | OUTPATIENT
Start: 2019-08-02 | End: 2019-08-02

## 2019-08-02 RX ADMIN — TRAMADOL HYDROCHLORIDE 50 MG: 50 TABLET, FILM COATED ORAL at 03:14

## 2019-08-02 RX ADMIN — IOPAMIDOL 100 ML: 612 INJECTION, SOLUTION INTRAVENOUS at 02:07

## 2019-08-02 RX ADMIN — SODIUM CHLORIDE 1000 ML: 900 INJECTION, SOLUTION INTRAVENOUS at 00:56

## 2019-08-02 NOTE — DISCHARGE INSTRUCTIONS
Learning About Relief for Back Pain  What is back tension and strain? Back strain happens when you overstretch, or pull, a muscle in your back. You may hurt your back in an accident or when you exercise or lift something. Most back pain will get better with rest and time. You can take care of yourself at home to help your back heal.  What can you do first to relieve back pain? When you first feel back pain, try these steps:  · Walk. Take a short walk (10 to 20 minutes) on a level surface (no slopes, hills, or stairs) every 2 to 3 hours. Walk only distances you can manage without pain, especially leg pain. · Relax. Find a comfortable position for rest. Some people are comfortable on the floor or a medium-firm bed with a small pillow under their head and another under their knees. Some people prefer to lie on their side with a pillow between their knees. Don't stay in one position for too long. · Try heat or ice. Try using a heating pad on a low or medium setting, or take a warm shower, for 15 to 20 minutes every 2 to 3 hours. Or you can buy single-use heat wraps that last up to 8 hours. You can also try an ice pack for 10 to 15 minutes every 2 to 3 hours. You can use an ice pack or a bag of frozen vegetables wrapped in a thin towel. There is not strong evidence that either heat or ice will help, but you can try them to see if they help. You may also want to try switching between heat and cold. · Take pain medicine exactly as directed. ¨ If the doctor gave you a prescription medicine for pain, take it as prescribed. ¨ If you are not taking a prescription pain medicine, ask your doctor if you can take an over-the-counter medicine. What else can you do? · Stretch and exercise. Exercises that increase flexibility may relieve your pain and make it easier for your muscles to keep your spine in a good, neutral position. And don't forget to keep walking. · Do self-massage.  You can use self-massage to unwind after work or school or to energize yourself in the morning. You can easily massage your feet, hands, or neck. Self-massage works best if you are in comfortable clothes and are sitting or lying in a comfortable position. Use oil or lotion to massage bare skin. · Reduce stress. Back pain can lead to a vicious [place]: Distress about the pain tenses the muscles in your back, which in turn causes more pain. Learn how to relax your mind and your muscles to lower your stress. Where can you learn more? Go to http://chito-jose manuel.info/. Enter I013 in the search box to learn more about \"Learning About Relief for Back Pain. \"  Current as of: March 21, 2017  Content Version: 11.5  © 7039-3784 [employer]. Care instructions adapted under license by [employer] (which disclaims liability or warranty for this information). If you have questions about a medical condition or this instruction, always ask your healthcare professional. [place] [address] any warranty or liability for your use of this information. Patient Education        Back Pain: Care Instructions  Your Care Instructions    Back pain has many possible causes. It is often related to problems with muscles and ligaments of the back. It may also be related to problems with the nerves, discs, or bones of the back. Moving, lifting, standing, sitting, or sleeping in an awkward way can strain the back. Sometimes you don't notice the injury until later. Arthritis is another common cause of back pain. Although it may hurt a lot, back pain usually improves on its own within several weeks. Most people recover in 12 weeks or less. Using good home treatment and being careful not to stress your back can help you feel better sooner. Follow-up care is a key part of your treatment and safety. Be sure to make and go to all appointments, and call your doctor if you are having problems.  It's also a good idea to know your test results and keep a list of the medicines you take. How can you care for yourself at home? · Sit or lie in positions that are most comfortable and reduce your pain. Try one of these positions when you lie down:  ? Lie on your back with your knees bent and supported by large pillows. ? Lie on the floor with your legs on the seat of a sofa or chair. ? Lie on your side with your knees and hips bent and a pillow between your legs. ? Lie on your stomach if it does not make pain worse. · Do not sit up in bed, and avoid soft couches and twisted positions. Bed rest can help relieve pain at first, but it delays healing. Avoid bed rest after the first day of back pain. · Change positions every 30 minutes. If you must sit for long periods of time, take breaks from sitting. Get up and walk around, or lie in a comfortable position. · Try using a heating pad on a low or medium setting for 15 to 20 minutes every 2 or 3 hours. Try a warm shower in place of one session with the heating pad. · You can also try an ice pack for 10 to 15 minutes every 2 to 3 hours. Put a thin cloth between the ice pack and your skin. · Take pain medicines exactly as directed. ? If the doctor gave you a prescription medicine for pain, take it as prescribed. ? If you are not taking a prescription pain medicine, ask your doctor if you can take an over-the-counter medicine. · Take short walks several times a day. You can start with 5 to 10 minutes, 3 or 4 times a day, and work up to longer walks. Walk on level surfaces and avoid hills and stairs until your back is better. · Return to work and other activities as soon as you can. Continued rest without activity is usually not good for your back. · To prevent future back pain, do exercises to stretch and strengthen your back and stomach. Learn how to use good posture, safe lifting techniques, and proper body mechanics. When should you call for help?   Call your doctor now or seek immediate medical care if:    · You have new or worsening numbness in your legs.     · You have new or worsening weakness in your legs. (This could make it hard to stand up.)     · You lose control of your bladder or bowels.    Watch closely for changes in your health, and be sure to contact your doctor if:    · You have a fever, lose weight, or don't feel well.     · You do not get better as expected. Where can you learn more? Go to http://chito-jose manuel.info/. Enter K483 in the search box to learn more about \"Back Pain: Care Instructions. \"  Current as of: September 20, 2018  Content Version: 12.1  © 0599-6090 [employer]. Care instructions adapted under license by [employer] (which disclaims liability or warranty for this information). If you have questions about a medical condition or this instruction, always ask your healthcare professional. [address] any warranty or liability for your use of this information.

## 2019-08-02 NOTE — ED PROVIDER NOTES
HPI Patient has a  history of chronic back pain. She presents to the ER with complaint of having abdominal pain and worsening of her low back pain x24 hours. She denies any history of urinary retention or incontinence, recent back trauma, fever, chills, weakness in her legs. However, she C/O having generalize abdominal pain x24 hours. Past Medical History:   Diagnosis Date    Anemia NEC     Bronchitis     History of bronchitis    H/O hernia repair     Heavy periods     Pelvic pain     Psychiatric disorder     bipolar, depression, PTSD    Uterine polyp        Past Surgical History:   Procedure Laterality Date    ABDOMEN SURGERY PROC UNLISTED      hernia X2     DELIVERY ONLY      6 previous    HX DILATION AND CURETTAGE      HX TUBAL LIGATION           History reviewed. No pertinent family history.     Social History     Socioeconomic History    Marital status: SINGLE     Spouse name: Not on file    Number of children: Not on file    Years of education: Not on file    Highest education level: Not on file   Occupational History    Not on file   Social Needs    Financial resource strain: Not on file    Food insecurity:     Worry: Not on file     Inability: Not on file    Transportation needs:     Medical: Not on file     Non-medical: Not on file   Tobacco Use    Smoking status: Current Some Day Smoker     Packs/day: 0.25    Smokeless tobacco: Never Used    Tobacco comment: patient states has cut back on smoking   Substance and Sexual Activity    Alcohol use: Yes     Comment: occasionally social     Drug use: No    Sexual activity: Yes     Partners: Male     Birth control/protection: None     Comment: last menses in 2012   Lifestyle    Physical activity:     Days per week: Not on file     Minutes per session: Not on file    Stress: Not on file   Relationships    Social connections:     Talks on phone: Not on file     Gets together: Not on file     Attends Latter day service: Not on file     Active member of club or organization: Not on file     Attends meetings of clubs or organizations: Not on file     Relationship status: Not on file    Intimate partner violence:     Fear of current or ex partner: Not on file     Emotionally abused: Not on file     Physically abused: Not on file     Forced sexual activity: Not on file   Other Topics Concern    Not on file   Social History Narrative    Not on file         ALLERGIES: Zofran odt [ondansetron]    Review of Systems   Constitutional: Negative. HENT: Negative. Eyes: Negative. Respiratory: Negative. Cardiovascular: Negative. Gastrointestinal: Negative. Endocrine: Negative. Genitourinary: Negative. Negative for difficulty urinating, dysuria, flank pain and urgency. Musculoskeletal: Positive for back pain. Skin: Negative. Allergic/Immunologic: Negative. Neurological: Negative. Hematological: Negative. Psychiatric/Behavioral: Negative. All other systems reviewed and are negative. Vitals:    08/02/19 0006   BP: 130/83   Pulse: 90   Resp: 16   Temp: 97.8 °F (36.6 °C)   SpO2: 100%            Physical Exam   Constitutional: She is oriented to person, place, and time. She appears well-developed and well-nourished. No distress. HENT:   Head: Normocephalic. Right Ear: External ear normal.   Left Ear: External ear normal.   Mouth/Throat: No oropharyngeal exudate. Eyes: Pupils are equal, round, and reactive to light. Conjunctivae and EOM are normal. Right eye exhibits no discharge. Left eye exhibits no discharge. No scleral icterus. Neck: Normal range of motion. Neck supple. No JVD present. No tracheal deviation present. No thyromegaly present. Cardiovascular: Normal rate, regular rhythm, normal heart sounds and intact distal pulses. Exam reveals no gallop and no friction rub. No murmur heard. Pulmonary/Chest: Effort normal and breath sounds normal. No stridor. No respiratory distress.  She has no wheezes. She has no rales. She exhibits no tenderness. Abdominal: Soft. Bowel sounds are normal. She exhibits no distension and no mass. There is no tenderness. There is no rebound and no guarding. Musculoskeletal: Normal range of motion. She exhibits no edema. Right shoulder: She exhibits no tenderness. Lymphadenopathy:     She has no cervical adenopathy. Neurological: She is alert and oriented to person, place, and time. She displays normal reflexes. No cranial nerve deficit. She exhibits normal muscle tone. Coordination normal.   Skin: Skin is warm and dry. No rash noted. She is not diaphoretic. No erythema. No pallor. Nursing note and vitals reviewed. MDM: Differential diagnosis: UTI, sciatica, diverticulitis, metromenorrhagia, supratentorial syndrome     Hospital course: Patient remained stable throughout ER evaluation. Patient found to have an anemia secondary to metromenorrhagia. No etiology of back pain was found with this ER visit. She was referred to his PCP for further evaluation.     Diagnosis:    Disposition: Discharge home

## 2019-08-02 NOTE — ED NOTES
Patient stating she is ready to leave. Informed patient that MD needed urinalysis. Patient refused. MD Jacoby Campbell made aware.

## 2019-08-02 NOTE — ED TRIAGE NOTES
Patient reports to ED with low back pain. Patient reports history of back spasms.  Patient also with complaints of lower abdominal pain

## 2020-01-18 PROCEDURE — 99284 EMERGENCY DEPT VISIT MOD MDM: CPT

## 2020-01-18 PROCEDURE — 96372 THER/PROPH/DIAG INJ SC/IM: CPT

## 2020-01-19 ENCOUNTER — APPOINTMENT (OUTPATIENT)
Dept: GENERAL RADIOLOGY | Age: 35
End: 2020-01-19
Attending: EMERGENCY MEDICINE
Payer: MEDICAID

## 2020-01-19 ENCOUNTER — APPOINTMENT (OUTPATIENT)
Dept: CT IMAGING | Age: 35
End: 2020-01-19
Attending: EMERGENCY MEDICINE
Payer: MEDICAID

## 2020-01-19 ENCOUNTER — HOSPITAL ENCOUNTER (EMERGENCY)
Age: 35
Discharge: HOME | End: 2020-01-19
Attending: EMERGENCY MEDICINE
Payer: MEDICAID

## 2020-01-19 VITALS
TEMPERATURE: 97.8 F | DIASTOLIC BLOOD PRESSURE: 75 MMHG | SYSTOLIC BLOOD PRESSURE: 115 MMHG | BODY MASS INDEX: 27.48 KG/M2 | WEIGHT: 140 LBS | OXYGEN SATURATION: 100 % | HEIGHT: 60 IN | RESPIRATION RATE: 20 BRPM | HEART RATE: 95 BPM

## 2020-01-19 DIAGNOSIS — S83.401A SPRAIN OF COLLATERAL LIGAMENT OF RIGHT KNEE, INITIAL ENCOUNTER: ICD-10-CM

## 2020-01-19 DIAGNOSIS — S80.01XA CONTUSION OF RIGHT KNEE, INITIAL ENCOUNTER: Primary | ICD-10-CM

## 2020-01-19 PROCEDURE — 73564 X-RAY EXAM KNEE 4 OR MORE: CPT

## 2020-01-19 PROCEDURE — 96372 THER/PROPH/DIAG INJ SC/IM: CPT

## 2020-01-19 PROCEDURE — 74011250637 HC RX REV CODE- 250/637: Performed by: EMERGENCY MEDICINE

## 2020-01-19 PROCEDURE — 73700 CT LOWER EXTREMITY W/O DYE: CPT

## 2020-01-19 PROCEDURE — 74011250636 HC RX REV CODE- 250/636: Performed by: EMERGENCY MEDICINE

## 2020-01-19 RX ORDER — MORPHINE SULFATE 2 MG/ML
2 INJECTION, SOLUTION INTRAMUSCULAR; INTRAVENOUS
Status: COMPLETED | OUTPATIENT
Start: 2020-01-19 | End: 2020-01-19

## 2020-01-19 RX ORDER — OXYCODONE AND ACETAMINOPHEN 5; 325 MG/1; MG/1
1-2 TABLET ORAL
Qty: 12 TAB | Refills: 0 | Status: SHIPPED | OUTPATIENT
Start: 2020-01-19 | End: 2020-01-26

## 2020-01-19 RX ORDER — OXYCODONE AND ACETAMINOPHEN 5; 325 MG/1; MG/1
2 TABLET ORAL
Status: COMPLETED | OUTPATIENT
Start: 2020-01-19 | End: 2020-01-19

## 2020-01-19 RX ADMIN — OXYCODONE HYDROCHLORIDE AND ACETAMINOPHEN 2 TABLET: 5; 325 TABLET ORAL at 01:11

## 2020-01-19 RX ADMIN — MORPHINE SULFATE 2 MG: 2 INJECTION, SOLUTION INTRAMUSCULAR; INTRAVENOUS at 02:40

## 2020-01-19 NOTE — ED PROVIDER NOTES
HPI is a 29-year-old female slipped and fell coming down a step twisting her right knee. Patient complained having severe pain in her right knee. Denies trauma to the remaining areas of her body. Past Medical History:   Diagnosis Date    Anemia NEC     Bronchitis     History of bronchitis    H/O hernia repair     Heavy periods     Pelvic pain     Psychiatric disorder     bipolar, depression, PTSD    Uterine polyp        Past Surgical History:   Procedure Laterality Date    ABDOMEN SURGERY PROC UNLISTED      hernia X2     DELIVERY ONLY      6 previous    HX DILATION AND CURETTAGE      HX TUBAL LIGATION           History reviewed. No pertinent family history.     Social History     Socioeconomic History    Marital status: SINGLE     Spouse name: Not on file    Number of children: Not on file    Years of education: Not on file    Highest education level: Not on file   Occupational History    Not on file   Social Needs    Financial resource strain: Not on file    Food insecurity:     Worry: Not on file     Inability: Not on file    Transportation needs:     Medical: Not on file     Non-medical: Not on file   Tobacco Use    Smoking status: Current Some Day Smoker     Packs/day: 0.25    Smokeless tobacco: Never Used    Tobacco comment: patient states has cut back on smoking   Substance and Sexual Activity    Alcohol use: Yes     Comment: occasionally social     Drug use: No    Sexual activity: Yes     Partners: Male     Birth control/protection: None     Comment: last menses in 2012   Lifestyle    Physical activity:     Days per week: Not on file     Minutes per session: Not on file    Stress: Not on file   Relationships    Social connections:     Talks on phone: Not on file     Gets together: Not on file     Attends Sabianism service: Not on file     Active member of club or organization: Not on file     Attends meetings of clubs or organizations: Not on file Relationship status: Not on file    Intimate partner violence:     Fear of current or ex partner: Not on file     Emotionally abused: Not on file     Physically abused: Not on file     Forced sexual activity: Not on file   Other Topics Concern    Not on file   Social History Narrative    Not on file         ALLERGIES: Zofran odt [ondansetron]    Review of Systems   Constitutional: Negative. HENT: Negative. Eyes: Negative. Respiratory: Negative. Cardiovascular: Negative. Gastrointestinal: Negative. Endocrine: Negative. Genitourinary: Negative. Musculoskeletal: Negative. Skin: Negative. Allergic/Immunologic: Negative. Neurological: Negative. Hematological: Negative. Psychiatric/Behavioral: Negative. All other systems reviewed and are negative. There were no vitals filed for this visit. Physical Exam  Vitals signs and nursing note reviewed. Constitutional:       General: She is not in acute distress. Appearance: She is well-developed. She is not diaphoretic. HENT:      Head: Normocephalic. Right Ear: External ear normal.      Left Ear: External ear normal.      Mouth/Throat:      Pharynx: No oropharyngeal exudate. Eyes:      General: No scleral icterus. Right eye: No discharge. Left eye: No discharge. Conjunctiva/sclera: Conjunctivae normal.      Pupils: Pupils are equal, round, and reactive to light. Neck:      Musculoskeletal: Normal range of motion and neck supple. Thyroid: No thyromegaly. Vascular: No JVD. Trachea: No tracheal deviation. Cardiovascular:      Rate and Rhythm: Normal rate and regular rhythm. Heart sounds: Normal heart sounds. No murmur. No friction rub. No gallop. Pulmonary:      Effort: Pulmonary effort is normal. No respiratory distress. Breath sounds: Normal breath sounds. No stridor. No wheezing or rales. Chest:      Chest wall: No tenderness.    Abdominal:      General: Bowel sounds are normal. There is no distension. Palpations: Abdomen is soft. There is no mass. Tenderness: There is no tenderness. There is no guarding or rebound. Musculoskeletal: Normal range of motion. General: No tenderness. Comments: Right knee: no edema, limited ROM secondary to pain, normal pulses and sensory. Lymphadenopathy:      Cervical: No cervical adenopathy. Skin:     General: Skin is warm and dry. Coloration: Skin is not pale. Findings: No erythema or rash. Neurological:      Mental Status: She is alert and oriented to person, place, and time. Cranial Nerves: No cranial nerve deficit. Motor: No abnormal muscle tone. Coordination: Coordination normal.      Deep Tendon Reflexes: Reflexes normal.          MDM       Procedures    Differential diagnosis: Fractured knee, contusion knee, dislocated patella, sprain    Diagnosis: Acute knee sprain    Disposition: Discharge home, follow Ortho in 2 days, return ER needed. Use crutches and knee immobilizer as prescribed    Dictation disclaimer:  Please note that this dictation was completed with KOWN, the computer voice recognition software. Quite often unanticipated grammatical, syntax, homophones, and other interpretive errors are inadvertently transcribed by the computer software. Please disregard these errors. Please excuse any errors that have escaped final proofreading.

## 2020-01-19 NOTE — ED NOTES
Pt discharged to home; able to transition from bed to wheelchair, and from wheelchair to car. Pt able to demonstrate effective use of crutches; ambulatory without difficulty. Calm/conversant; respirations regular/non labored/skin warm/dry. No pain/discomfort to right arm. Instructed to rest/ice/elevate extremity, follow up with referred orthopedist, and to return to ED for worsening concerns. Pt voiced her understanding. Knee immobilizer replaced by Ayesha Lora per VO Dr Jhoan Edwards.

## 2020-01-19 NOTE — ED TRIAGE NOTES
Pt states was at top of stairs playing with her kids when her right knee buckled causing her to fall down the stairs to bottom. Pt c/o severe right knee pain.

## 2020-01-19 NOTE — ED NOTES
Dr Wilkinson Antes inspecting knee immobilizer placed by LOUIE Dominguez/romain at this time. Pt intermittently tearful/grimacing.

## 2020-01-19 NOTE — DISCHARGE INSTRUCTIONS
Patient Education     Contusion: Care Instructions  Your Care Instructions  Contusion is the medical term for a bruise. It is the result of a direct blow or an impact, such as a fall. Contusions are common sports injuries. Most people think of a bruise as a black-and-blue spot. This happens when small blood vessels get torn and leak blood under the skin. But bones, muscles, and organs can also get bruised. This may damage deep tissues but not cause a bruise you can see. The doctor will do a physical exam to find the location of your contusion. You may also have tests to make sure you do not have a more serious injury, such as a broken bone or nerve damage. These may include X-rays or other imaging tests like a CT scan or MRI. Deep-tissue contusions may cause pain and swelling. But if there is no serious damage, they will often get better in a few weeks with home treatment. The doctor has checked you carefully, but problems can develop later. If you notice any problems or new symptoms, get medical treatment right away. Follow-up care is a key part of your treatment and safety. Be sure to make and go to all appointments, and call your doctor if you are having problems. It's also a good idea to know your test results and keep a list of the medicines you take. How can you care for yourself at home? · Put ice or a cold pack on the sore area for 10 to 20 minutes at a time to stop swelling. Put a thin cloth between the ice pack and your skin. · Be safe with medicines. Read and follow all instructions on the label. ¨ If the doctor gave you a prescription medicine for pain, take it as prescribed. ¨ If you are not taking a prescription pain medicine, ask your doctor if you can take an over-the-counter medicine. · If you can, prop up the sore area on pillows as much as possible for the next few days. Try to keep the sore area above the level of your heart. When should you call for help?   Call your doctor now or seek immediate medical care if:  · Your pain gets worse. · You have new or worse swelling. · You have tingling, weakness, or numbness in the area near the contusion. · The area near the contusion is cold or pale. Watch closely for changes in your health, and be sure to contact your doctor if:  · You do not get better as expected. Where can you learn more? Go to [employer]  Enter [id number] in the search box to learn more about \"Contusion: Care Instructions. \"   © 0561-9359 Healthwise, Incorporated. Care instructions adapted under license by [facility] (which disclaims liability or warranty for this information). This care instruction is for use with your licensed healthcare professional. If you have questions about a medical condition or this instruction, always ask your healthcare professional. [address] any warranty or liability for your use of this information.   Content Version: 31.6.195474; Current as of: May 22, 2015

## 2020-01-19 NOTE — ED NOTES
Pt resting comfortably present in bed; significant other resting alongside her. Respirations regular/non labored/skin warm and dry. Calm/conversant; no distress noted. No pain at injection site/no current numbness/paresthesias to arm. Rails up x 2/call light in reach.

## 2020-01-19 NOTE — ED NOTES
Pt intermittently crying/grimacing; states feels as though her patella is out of line (not visibly noted). Dr Nellie Card notified; order for CT of right knee placed.

## 2020-01-28 ENCOUNTER — OFFICE VISIT (OUTPATIENT)
Dept: ORTHOPEDIC SURGERY | Facility: CLINIC | Age: 35
End: 2020-01-28

## 2020-01-28 VITALS
OXYGEN SATURATION: 99 % | WEIGHT: 150.4 LBS | HEIGHT: 60 IN | RESPIRATION RATE: 16 BRPM | SYSTOLIC BLOOD PRESSURE: 89 MMHG | DIASTOLIC BLOOD PRESSURE: 63 MMHG | TEMPERATURE: 98.3 F | BODY MASS INDEX: 29.53 KG/M2 | HEART RATE: 85 BPM

## 2020-01-28 DIAGNOSIS — M25.362 PATELLAR INSTABILITY OF BOTH KNEES: Primary | ICD-10-CM

## 2020-01-28 DIAGNOSIS — M25.361 PATELLAR INSTABILITY OF BOTH KNEES: Primary | ICD-10-CM

## 2020-01-28 NOTE — PROGRESS NOTES
Patient: Jose D Villanueva                MRN: [id number]       SSN: [id number]  YOB: 1985        AGE: 30 y.o. SEX: female  Body mass index is 29.37 kg/m². PCP: None  20    Chief Complaint: Bilateral knee pain    HISTORY OF PRESENT ILLNESS:  Irene Vera is a 30year-old female who presents to the office today with bilateral knee pain. She injured both knees recently about ten days ago when she fell down some stairs. She had an abrasion to the left knee and a pain to the right knee that she has noticed swelling. She says she felt both kneecaps pop out of place and then immediately pop back in. She has had this before when she was a teenager, but not for some years. Past Medical History:   Diagnosis Date    Anemia NEC     Bronchitis     History of bronchitis    H/O hernia repair     Heavy periods     Pelvic pain     Psychiatric disorder     bipolar, depression, PTSD    Uterine polyp        History reviewed. No pertinent family history. Current Outpatient Medications   Medication Sig Dispense Refill    oxyCODONE-acetaminophen (PERCOCET) 5-325 mg per tablet Take  by mouth every four (4) hours as needed for Pain.  escitalopram oxalate (LEXAPRO) 20 mg tablet Take 20 mg by mouth daily.  multivitamin (ONE A DAY) tablet Take 1 Tab by mouth daily.          Allergies   Allergen Reactions    Zofran Odt [Ondansetron] Hives       Past Surgical History:   Procedure Laterality Date    ABDOMEN SURGERY PROC UNLISTED      hernia X2     DELIVERY ONLY      6 previous    HX DILATION AND CURETTAGE      HX TUBAL LIGATION         Social History     Socioeconomic History    Marital status: SINGLE     Spouse name: Not on file    Number of children: Not on file    Years of education: Not on file    Highest education level: Not on file   Occupational History    Not on file   Social Needs    Financial resource strain: Not on file    Food insecurity: Worry: Not on file     Inability: Not on file    Transportation needs:     Medical: Not on file     Non-medical: Not on file   Tobacco Use    Smoking status: Current Some Day Smoker     Packs/day: 0.25    Smokeless tobacco: Never Used    Tobacco comment: patient states has cut back on smoking   Substance and Sexual Activity    Alcohol use: Yes     Comment: occasionally social     Drug use: No    Sexual activity: Yes     Partners: Male     Birth control/protection: None     Comment: last menses in August 2012   Lifestyle    Physical activity:     Days per week: Not on file     Minutes per session: Not on file    Stress: Not on file   Relationships    Social connections:     Talks on phone: Not on file     Gets together: Not on file     Attends [employer] service: Not on file     Active member of club or organization: Not on file     Attends meetings of clubs or organizations: Not on file     Relationship status: Not on file    Intimate partner violence:     Fear of current or ex partner: Not on file     Emotionally abused: Not on file     Physically abused: Not on file     Forced sexual activity: Not on file   Other Topics Concern    Not on file   Social History Narrative    Not on file       REVIEW OF SYSTEMS:      CON: negative for recent weight loss/gain, fever, or chills  EYE: negative for double or blurry vision  ENT: negative for hoarseness  RS:   negative for cough, URI, SOB  CV:  negative for chest pain, palpitations  GI:    negative for blood in stool, nausea/vomiting  :  negative for blood in urine  MS: As per HPI  Other systems reviewed and noted below. PHYSICAL EXAMINATION:  Visit Vitals  BP (!) 89/63   Pulse 85   Temp 98.3 °F (36.8 °C) (Oral)   Resp 16   Ht 5' (1.524 m)   Wt 150 lb 6.4 oz (68.2 kg)   LMP 01/19/2020 (Approximate)   SpO2 99%   BMI 29.37 kg/m²     Body mass index is 29.37 kg/m². GENERAL: Alert and oriented x3, in no acute distress, well-developed, well-nourished.   HEENT: Normocephalic, atraumatic. RESP: Non labored breathing with equal chest rise on inspiration. CV: Well perfused extremities. No cyanosis or clubbing noted. ABDOMEN: Soft, non-tender, non-distended. PHYSICAL EXAM:  Physical exam of both knees is notable for an effusion on the right. No significant effusion on the left. Pain and apprehension with lateral patellar testing. Pain with deep knee flexion past 90 degrees bilaterally. She is neurovascularly intact distally. IMAGING:  X-rays and a CT scan of the right knee were reviewed, which do not show any obvious bony abnormalities. ASSESSMENT AND PLAN:   Obi Hidalgo is a 30year-old female with bilateral knee trauma. She reports a history of subluxation of both kneecaps. I have recommended braces and physical therapy for this. I do not recommend surgery at this time.               Electronically signed by: Shaila Gu MD

## 2020-01-28 NOTE — PROGRESS NOTES
1. Have you been to the ER, urgent care clinic since your last visit? Hospitalized since your last visit? No    2. Have you seen or consulted any other health care providers outside of the [address] [place] [address] since your last visit? Include any pap smears or colon screening.  No

## 2020-02-14 ENCOUNTER — HOSPITAL ENCOUNTER (OUTPATIENT)
Dept: PHYSICAL THERAPY | Age: 35
Discharge: HOME | End: 2020-02-14
Payer: MEDICAID

## 2020-02-14 PROCEDURE — 97162 PT EVAL MOD COMPLEX 30 MIN: CPT

## 2020-02-14 PROCEDURE — 97110 THERAPEUTIC EXERCISES: CPT

## 2020-02-14 NOTE — PROGRESS NOTES
PT DAILY TREATMENT NOTE 10-18    Patient Name: Freda Ponce  Date:2020  : 1985  [x]  Patient  Verified  Payor: Alisa Johnston / Plan: Mayte Castanon / Product Type: Managed Care Medicaid /    In time:1205  Out time:1249  Total Treatment Time (min): 44  Visit #: 1 of 9    Treatment Area: Right knee pain [M25.561]  Left knee pain [M25.562]    SUBJECTIVE  Pain Level (0-10 scale): 2  Any medication changes, allergies to medications, adverse drug reactions, diagnosis change, or new procedure performed?: [x] No    [] Yes (see summary sheet for update)  Subjective functional status/changes:   [] No changes reported  See Evaluation     OBJECTIVE    15 min [x]Eval                  []Re-Eval       29 min Therapeutic Exercise:  [x] See flow sheet : explanation, demonstration, performance and distribution of HEP   Rationale: increase ROM and increase strength to improve the patients ability to perform ADLs          With   [] TE   [] TA   [] neuro   [] other: Patient Education: [x] Review HEP    [] Progressed/Changed HEP based on:   [] positioning   [] body mechanics   [] transfers   [] heat/ice application    [] other:      Other Objective/Functional Measures: see evaluation      Pain Level (0-10 scale) post treatment: 3    ASSESSMENT/Changes in Function: see POC    Patient will continue to benefit from skilled PT services to modify and progress therapeutic interventions, address functional mobility deficits, address ROM deficits, address strength deficits, analyze and address soft tissue restrictions, analyze and cue movement patterns, analyze and modify body mechanics/ergonomics, assess and modify postural abnormalities, address imbalance/dizziness and instruct in home and community integration to attain remaining goals. [x]  See Plan of Care  []  See progress note/recertification  []  See Discharge Summary         Progress towards goals / Updated goals:  Short Term Goals:  To be accomplished in 2 weeks:  1. Pt will report compliance with HEP in order to supplement PT treatment    Eval = established  2. Pt will demonstrate right knee AROM to 125degrees of flexion in order to improve ability to ascend/descend curbs in the community   Eval = 100degrees  3. Pt will demonstrate right knee AROM to 0degrees extension in order to normalize gait pattern. Eval = lacking 5degrees    Long Term Goals: To be accomplished in 9 treatments:  1. Pt will demonstrate bilateral hip abduction strength to 5/5 in order to improve ability to ascend/descend curbs in the community. Eval: right = 4/5, left = 3/5  2. Pt will demonstrate bilateral knee strength minimum 5/5 in order to improve ambulation distances for ADLs and community engagement. Eval: ext: right = 4/5, left = 5-/5; flex: right = 4-/5, left = 4/5  3. Pt will score at least 54 on FOTO in order to improve overall function, decrease pain, and facilitate return to [facility].    Eval = 32    PLAN  [x]  Upgrade activities as tolerated     [x]  Continue plan of care  []  Update interventions per flow sheet       []  Discharge due to:_  []  Other:_      Evelyn Yuan, PT 2/14/2020  1:51 PM    Future Appointments   Date Time Provider Shabnam Flores   2/17/2020  9:30 AM Shantel Zhu, PTA [facility]   2/19/2020 10:00 AM Ardath Shoulders [facility]   2/24/2020 10:00 AM Shantel Zhu, PTA [facility]   2/26/2020 10:00 AM Shantel Marko, PTA [facility]   3/2/2020 10:00 AM Shantel Zhu, PTA [facility]   3/4/2020  9:30 AM Shantel Zhu, PTA [facility]   3/9/2020 10:30 AM Rosalva Rutherford, PT [facility]   3/11/2020 10:00 AM [facility]

## 2020-02-14 NOTE — PROGRESS NOTES
[facility] - [place] [address]  [address], [address] [phone] fax    Plan of Care/ Statement of Necessity for Physical Therapy Services           Patient name: Gavi Mota Start of Care: 2020   Referral source: Can Julian MD : 1985    Medical Diagnosis: Right knee pain [M25.561]  Left knee pain [M25.562]  Payor: Abrahan Fillers / Plan: [id number] / Product Type: Managed Care Medicaid /  Onset Date:2020    Treatment Diagnosis: right and left knee pain    Prior Hospitalization: see medical history Provider#: [id number]   Medications: Verified on Patient summary List    Comorbidities: history of patellar subluxation, anxiety, sciatica, hernia repair x2   Prior Level of Function: functionally (I)     The Plan of Care and following information is based on the information from the initial evaluation. Assessment/ key information:   Ms. Viviana Hough is a 35yo female who presents to PT with patellar-femoral pain syndrome s/p a fall down the stairs in early 2020. Pt demonstrates postural knee valgus, decreased AROM on the right, decreased LE strength bilaterally, and increased patellar mobility with positive apprehension test bilaterally. Pt ligamentous testing was negative today, but may have been impaired due to guarding and increased pain; pt may benefit from re-testing at future appointment when pain is managed due to complaints of knee buckling and subsequent falls. Pt deficits impair her ability to ambulate, perform ADLs and work duties at [employer]. In addition, pt may benefit from assessment for concussion due to complaints of memory difficulties, dizziness, nausea, and increased frequency of headaches.  Pt will benefit from skilled PT in order to address listed deficits, decrease pain, and maximize functional potential.     Evaluation Complexity History MEDIUM  Complexity : 1-2 comorbidities / personal factors will impact the outcome/ POC ; Examination MEDIUM Complexity : 3 Standardized tests and measures addressing body structure, function, activity limitation and / or participation in recreation  ;Presentation MEDIUM Complexity : Evolving with changing characteristics  ; Clinical Decision Making MEDIUM Complexity : FOTO score of 26-74  Overall Complexity Rating: MEDIUM  Problem List: pain affecting function, decrease ROM, decrease strength, edema affecting function, impaired gait/ balance, decrease ADL/ functional abilitiies, decrease activity tolerance, decrease flexibility/ joint mobility and decrease transfer abilities   Treatment Plan may include any combination of the following: Therapeutic exercise, Therapeutic activities, Neuromuscular re-education, Physical agent/modality, Gait/balance training, Manual therapy, Aquatic therapy, Patient education, Self Care training, Functional mobility training, Home safety training and Stair training  Patient / Family readiness to learn indicated by: asking questions, trying to perform skills and interest  Persons(s) to be included in education: patient (P)  Barriers to Learning/Limitations: None  Patient Goal (s): want my knees to get back functioning  Patient Self Reported Health Status: fair  Rehabilitation Potential: excellent  Short Term Goals: To be accomplished in 2 weeks:  1. Pt will report compliance with HEP in order to supplement PT treatment    Eval = established  2. Pt will demonstrate right knee AROM to 125degrees of flexion in order to improve ability to ascend/descend curbs in the community   Eval = 100degrees  3. Pt will demonstrate right knee AROM to 0degrees extension in order to normalize gait pattern. Eval = lacking 5degrees    Long Term Goals: To be accomplished in 9 treatments:  1. Pt will demonstrate bilateral hip abduction strength to 5/5 in order to improve ability to ascend/descend curbs in the community. Eval: right = 4/5, left = 3/5  2.   Pt will demonstrate bilateral knee strength minimum 5/5 in order to improve ambulation distances for ADLs and community engagement. Eval: ext: right = 4/5, left = 5-/5; flex: right = 4-/5, left = 4/5  3. Pt will score at least 54 on FOTO in order to improve overall function, decrease pain, and facilitate return to PLOF. Eval = 32    Frequency / Duration: Patient to be seen 2 times per week for 9 treatments. Patient/ Caregiver education and instruction: Diagnosis, prognosis, self care, activity modification and exercises   [x]  Plan of care has been reviewed with LEONARD Rothman, PT 2/14/2020 1:36 PM    ________________________________________________________________________    I certify that the above Therapy Services are being furnished while the patient is under my care. I agree with the treatment plan and certify that this therapy is necessary.     Physician's Signature:____________Date:_________TIME:________    ** Signature, Date and Time must be completed for valid certification **    Please sign and return to [facility] [place] [address]  [address] [place], [address] [phone] fax

## 2020-02-19 ENCOUNTER — APPOINTMENT (OUTPATIENT)
Dept: PHYSICAL THERAPY | Age: 35
End: 2020-02-19
Payer: MEDICAID

## 2020-02-21 ENCOUNTER — HOSPITAL ENCOUNTER (OUTPATIENT)
Dept: PHYSICAL THERAPY | Age: 35
Discharge: HOME | End: 2020-02-21
Payer: MEDICAID

## 2020-02-21 PROCEDURE — 97112 NEUROMUSCULAR REEDUCATION: CPT

## 2020-02-21 PROCEDURE — 97110 THERAPEUTIC EXERCISES: CPT

## 2020-02-21 PROCEDURE — 97530 THERAPEUTIC ACTIVITIES: CPT

## 2020-02-21 NOTE — PROGRESS NOTES
PT DAILY TREATMENT NOTE 10-18    Patient Name: Giovani Garza  Date:2020  : 1985  [x]  Patient  Verified  Payor: Jaqueline Gimenez / Plan: [id number] / Product Type: Managed Care Medicaid /    In time:1000  Out time:1048  Total Treatment Time (min): 3  Visit #: 2 of 9    Treatment Area: Right knee pain [M25.561]  Left knee pain [M25.562]    SUBJECTIVE  Pain Level (0-10 scale): 3  Any medication changes, allergies to medications, adverse drug reactions, diagnosis change, or new procedure performed?: [x] No    [] Yes (see summary sheet for update)  Subjective functional status/changes:   [] No changes reported  Pt reports she continues to have difficulty straightening her right knee. Yesterday she had 6/10 pain but today she is just \"uncomfortable. \"    OBJECTIVE    25 min Therapeutic Exercise:  [x] See flow sheet :   Rationale: increase ROM and increase strength to improve the patients ability to perform ADLs    10 min Therapeutic Activity:  [x]  See flow sheet :   Rationale: increase strength, improve coordination and improve balance  to improve the patients ability to negotiate home and the community     13 min Neuromuscular Re-education:  [x]  See flow sheet :   Rationale: increase strength and improve balance  to improve the patients mobility and stability          With   [] TE   [] TA   [] neuro   [] other: Patient Education: [x] Review HEP    [] Progressed/Changed HEP based on:   [] positioning   [] body mechanics   [] transfers   [] heat/ice application    [] other:      Other Objective/Functional Measures: initiated exercises per flow sheet     Pain Level (0-10 scale) post treatment: 3    ASSESSMENT/Changes in Function: Pt was able to initiate exercises per flow sheet without increase in symptoms. Pt right LE extension has significantly improved since evaluation, meeting short term goal of at least 0 degrees. Pt challenged with abduciton exercises and fatigued due to weakness. Patient will continue to benefit from skilled PT services to modify and progress therapeutic interventions, address functional mobility deficits, address ROM deficits, address strength deficits, analyze and address soft tissue restrictions, analyze and cue movement patterns, analyze and modify body mechanics/ergonomics, assess and modify postural abnormalities, address imbalance/dizziness and instruct in home and community integration to attain remaining goals. []  See Plan of Care  []  See progress note/recertification  []  See Discharge Summary         Progress towards goals / Updated goals:  Short Term Goals: To be accomplished in 2 weeks:  1. Pt will report compliance with HEP in order to supplement PT treatment                          Eval = established   Reports compliance  2. Pt will demonstrate right knee AROM to 125degrees of flexion in order to improve ability to ascend/descend curbs in the community              Eval = 100degrees  3. Pt will demonstrate right knee AROM to 0degrees extension in order to normalize gait pattern. Eval = lacking 5degrees   MET = 3 hyperextension     Long Term Goals: To be accomplished in 9 treatments:  1. Pt will demonstrate bilateral hip abduction strength to 5/5 in order to improve ability to ascend/descend curbs in the community. Eval: right = 4/5, left = 3/5  2. Pt will demonstrate bilateral knee strength minimum 5/5 in order to improve ambulation distances for ADLs and community engagement. Eval: ext: right = 4/5, left = 5-/5; flex: right = 4-/5, left = 4/5  3. Pt will score at least 54 on FOTO in order to improve overall function, decrease pain, and facilitate return to PLOF.               Eval = 32   To be reassessed at [address]  [x]  Upgrade activities as tolerated     [x]  Continue plan of care  []  Update interventions per flow sheet       []  Discharge due to:_  []  Other:_      Bereket Murillo, PT 2/21/2020  10:02 AM    Future Appointments   Date Time Provider Shabnam Castilloi   2/24/2020 10:00 AM Rani Gunn, PTA [facility]   2/26/2020 10:00 AM Rani Gunn, PTA [facility]   3/2/2020 10:00 AM Rani Gunn, PTA [facility]   3/4/2020  9:30 AM Rani Gunn, PTA [facility]   3/9/2020 10:30 AM Charla Lundberg, PT [facility]   3/11/2020 10:00 AM Rani Gunn, PTA [facility]

## 2020-02-24 ENCOUNTER — HOSPITAL ENCOUNTER (OUTPATIENT)
Dept: PHYSICAL THERAPY | Age: 35
Discharge: HOME | End: 2020-02-24
Payer: MEDICAID

## 2020-02-24 PROCEDURE — 97112 NEUROMUSCULAR REEDUCATION: CPT

## 2020-02-24 PROCEDURE — 97110 THERAPEUTIC EXERCISES: CPT

## 2020-02-24 NOTE — PROGRESS NOTES
PT DAILY TREATMENT NOTE 10-18    Patient Name: Fredy Flores  Date:2020  : 1985  [x]  Patient  Verified  Payor: Nicolas Maguire / Plan: Serge Dennis / Product Type: Managed Care Medicaid /    In time:1005  Out time:1052  Total Treatment Time (min): 52  Visit #: 3 of 9    Treatment Area: Right knee pain [M25.561]  Left knee pain [M25.562]    SUBJECTIVE  Pain Level (0-10 scale): 0  Any medication changes, allergies to medications, adverse drug reactions, diagnosis change, or new procedure performed?: [x] No    [] Yes (see summary sheet for update)  Subjective functional status/changes:   [] No changes reported  \"No pain. \"    OBJECTIVE    Modality rationale: patient declined   Min Type Additional Details    [] Estim:  []Unatt       []IFC  []Premod                        []Other:  []w/ice   []w/heat  Position:  Location:    [] Estim: []Att    []TENS instruct  []NMES                    []Other:  []w/US   []w/ice   []w/heat  Position:  Location:    []  Traction: [] Cervical       []Lumbar                       [] Prone          []Supine                       []Intermittent   []Continuous Lbs:  [] before manual  [] after manual    []  Ultrasound: []Continuous   [] Pulsed                           []1MHz   []3MHz W/cm2:  Location:    []  Iontophoresis with dexamethasone         Location: [] Take home patch   [] In clinic    []  Ice     []  heat  []  Ice massage  []  Laser   []  Anodyne Position:  Location:    []  Laser with stim  []  Other:  Position:  Location:    []  Vasopneumatic Device Pressure:       [] lo [] med [] hi   Temperature: [] lo [] med [] hi   [] Skin assessment post-treatment:  []intact []redness- no adverse reaction    []redness - adverse reaction:     23 min Therapeutic Exercise:  [x] See flow sheet :   Rationale: increase ROM and increase strength to improve the patients ability to perform ADLs    24 min Neuromuscular Re-education:  [x]  See flow sheet : quad re-ed activities Rationale: increase ROM, increase strength, improve coordination, improve balance and increase proprioception  to improve the patients ability to improve mobility, stance stability, and gait        With   [x] TE   [] TA   [x] neuro   [] other: Patient Education: [x] Review HEP    [] Progressed/Changed HEP based on:   [x] positioning   [x] body mechanics   [] transfers   [] heat/ice application    [] other:      Other Objective/Functional Measures:   Right knee anterior drawer test (+) for laxity      Pain Level (0-10 scale) post treatment: 0    ASSESSMENT/Changes in Function: Pt is limited with squat depth due to pain and poor mechanics. Needs quite a bit of cuing to improve squat form. Tested her right knee anterior drawer since pain was declined and this was positive for laxity. Patient will continue to benefit from skilled PT services to modify and progress therapeutic interventions, address functional mobility deficits, address ROM deficits, address strength deficits, analyze and address soft tissue restrictions, analyze and cue movement patterns, analyze and modify body mechanics/ergonomics, assess and modify postural abnormalities, address imbalance/dizziness and instruct in home and community integration to attain remaining goals.      [x]  See Plan of Care  []  See progress note/recertification  []  See Discharge Summary         Progress towards goals / Updated goals:  Short Term Goals: To be accomplished in 2 weeks:  1.  Pt will report compliance with HEP in order to supplement PT treatment                          Eval = established              MET  2.  Pt will demonstrate right knee AROM to 125degrees of flexion in order to improve ability to ascend/descend curbs in the community              Eval = 100degrees   Measure at [place]  3.  Pt will demonstrate right knee AROM to 0degrees extension in order to normalize gait pattern.               Eval = lacking 5degrees              MET = 3 hyperextension     Long Term Goals: To be accomplished in 9 treatments:  1.  Pt will demonstrate bilateral hip abduction strength to 5/5 in order to improve ability to ascend/descend curbs in the community.             Eval: right = 4/5, left = 3/5   Making progress  2.  Pt will demonstrate bilateral knee strength minimum 5/5 in order to improve ambulation distances for ADLs and community engagement.               Eval: ext: right = 4/5, left = 5-/5; flex: right = 4-/5, left = 4/5   Making progress  3.  Pt will score at least 54 on FOTO in order to improve overall function, decrease pain, and facilitate return to [facility].               Eval = 32              To be reassessed at [address] [facility]  []  Upgrade activities as tolerated     [x]  Continue plan of care  []  Update interventions per flow sheet       []  Discharge due to:_  []  Other:_      Asael Clement PTA, CSCS 2/24/2020  11:09 AM    Future Appointments   Date Time Provider Shabnam Flores   2/26/2020 10:00 AM Sanya Hodge PTA [facility]   3/2/2020 10:00 AM Sanya Hodge PTA [facility]   3/4/2020  9:30 AM Sanya Hodge PTA [facility]   3/9/2020 10:30 AM Afsaneh De Oliveira, PT [facility]   3/11/2020 10:00 AM Sanya Hodge PTA [facility]

## 2020-02-26 ENCOUNTER — APPOINTMENT (OUTPATIENT)
Dept: PHYSICAL THERAPY | Age: 35
End: 2020-02-26
Payer: MEDICAID

## 2020-03-02 ENCOUNTER — HOSPITAL ENCOUNTER (OUTPATIENT)
Dept: PHYSICAL THERAPY | Age: 35
Discharge: HOME | End: 2020-03-02
Payer: MEDICAID

## 2020-03-02 PROCEDURE — 97112 NEUROMUSCULAR REEDUCATION: CPT

## 2020-03-02 PROCEDURE — 97110 THERAPEUTIC EXERCISES: CPT

## 2020-03-02 NOTE — PROGRESS NOTES
PT DAILY TREATMENT NOTE 10-18    Patient Name: Nola Whitfield  Date:3/2/2020  : 1985  [x]  Patient  Verified  Payor: Yasmin Manzoor / Plan: [facility] MEDICAID / Product Type: Managed Care Medicaid /    In time:1015  Out time:1112  Total Treatment Time (min): 62  Visit #: 4 of 9    Treatment Area: Right knee pain [M25.561]  Left knee pain [M25.562]    SUBJECTIVE  Pain Level (0-10 scale): 0  Any medication changes, allergies to medications, adverse drug reactions, diagnosis change, or new procedure performed?: [x] No    [] Yes (see summary sheet for update)  Subjective functional status/changes:   [] No changes reported  \"No pain. \"    OBJECTIVE    Modality rationale: decrease pain to improve the patients ability to improve mobility and gait   Min Type Additional Details    [] Estim:  []Unatt       []IFC  []Premod                        []Other:  []w/ice   []w/heat  Position:  Location:    [] Estim: []Att    []TENS instruct  []NMES                    []Other:  []w/US   []w/ice   []w/heat  Position:  Location:    []  Traction: [] Cervical       []Lumbar                       [] Prone          []Supine                       []Intermittent   []Continuous Lbs:  [] before manual  [] after manual    []  Ultrasound: []Continuous   [] Pulsed                           []1MHz   []3MHz W/cm2:  Location:    []  Iontophoresis with dexamethasone         Location: [] Take home patch   [] In clinic   10 []  Ice     [x]  heat  []  Ice massage  []  Laser   []  Anodyne Position: seated   Location: right knee    []  Laser with stim  []  Other:  Position:  Location:    []  Vasopneumatic Device Pressure:       [] lo [] med [] hi   Temperature: [] lo [] med [] hi   [x] Skin assessment post-treatment:  [x]intact []redness- no adverse reaction    []redness - adverse reaction:     23 min Therapeutic Exercise:  [x] See flow sheet :   Rationale: increase ROM and increase strength to improve the patients ability to perform ADLs    24 min Neuromuscular Re-education:  [x]  See flow sheet : quad re-ed activities    Rationale: increase ROM, increase strength, improve coordination, improve balance and increase proprioception  to improve the patients ability to improve mobility, stance stability, and gait        With   [x] TE   [] TA   [x] neuro   [] other: Patient Education: [x] Review HEP    [] Progressed/Changed HEP based on:   [x] positioning   [x] body mechanics   [] transfers   [] heat/ice application    [] other:      Other Objective/Functional Measures:      Pain Level (0-10 scale) post treatment: 0    ASSESSMENT/Changes in Function: Pt's pain is well-controlled currently. Still has some limitations with squatting. Would also benefit from continued strengthening to improve functional mobility and gait. Patient will continue to benefit from skilled PT services to modify and progress therapeutic interventions, address functional mobility deficits, address ROM deficits, address strength deficits, analyze and address soft tissue restrictions, analyze and cue movement patterns, analyze and modify body mechanics/ergonomics, assess and modify postural abnormalities, address imbalance/dizziness and instruct in home and community integration to attain remaining goals.      [x]  See Plan of Care  []  See progress note/recertification  []  See Discharge Summary         Progress towards goals / Updated goals:  Short Term Goals: To be accomplished in 2 weeks:  1.  Pt will report compliance with HEP in order to supplement PT treatment                          Eval = established              MET  2.  Pt will demonstrate right knee AROM to 125degrees of flexion in order to improve ability to ascend/descend curbs in the community              Eval = 100degrees              Measure at [place]  3.  Pt will demonstrate right knee AROM to 0degrees extension in order to normalize gait pattern.               Eval = lacking 5degrees              MET = 3 hyperextension     Long Term Goals: To be accomplished in 9 treatments:  1.  Pt will demonstrate bilateral hip abduction strength to 5/5 in order to improve ability to ascend/descend curbs in the community.             Eval: right = 4/5, left = 3/5              Making progress  2.  Pt will demonstrate bilateral knee strength minimum 5/5 in order to improve ambulation distances for ADLs and community engagement.               Eval: ext: right = 4/5, left = 5-/5; flex: right = 4-/5, left = 4/5              Making progress  3.  Pt will score at least 54 on FOTO in order to improve overall function, decrease pain, and facilitate return to [facility].               Eval = 32              To be reassessed at [address] [facility]  []  Upgrade activities as tolerated     [x]  Continue plan of care  []  Update interventions per flow sheet       []  Discharge due to:_  []  Other:_      Heraclio Muñoz PTA, [facility] 3/2/2020  11:19 AM    Future Appointments   Date Time Provider Shabnam Flores   3/4/2020  9:30 [facility]   3/9/2020 10:30 AM Maria Del Carmen Lopez PT [facility]   3/11/2020 10:00 AM [facility]

## 2020-03-04 ENCOUNTER — APPOINTMENT (OUTPATIENT)
Dept: PHYSICAL THERAPY | Age: 35
End: 2020-03-04
Payer: MEDICAID

## 2020-03-06 ENCOUNTER — HOSPITAL ENCOUNTER (OUTPATIENT)
Dept: PHYSICAL THERAPY | Age: 35
Discharge: HOME | End: 2020-03-06
Payer: MEDICAID

## 2020-03-06 PROCEDURE — 97110 THERAPEUTIC EXERCISES: CPT

## 2020-03-06 PROCEDURE — 97112 NEUROMUSCULAR REEDUCATION: CPT

## 2020-03-06 NOTE — PROGRESS NOTES
PT DAILY TREATMENT NOTE 10-18    Patient Name: Angela Rodriguez  Date:3/6/2020  : 1985  [x]  Patient  Verified  Payor: Alondra Coronado / Plan: [id number] / Product Type: Managed Care Medicaid /    In time: 7:43   Out time: 8:19  Total Treatment Time (min): 36  Visit #: 5 of 9    Treatment Area: Right knee pain [M25.561]  Left knee pain [M25.562]    SUBJECTIVE  Pain Level (0-10 scale): 3 discomfort  Any medication changes, allergies to medications, adverse drug reactions, diagnosis change, or new procedure performed?: [x] No    [] Yes (see summary sheet for update)  Subjective functional status/changes:   [] No changes reported  Pt reports having some discomfort today in the right knee. OBJECTIVE    16 min Therapeutic Exercise:  [x] See flow sheet :   Rationale: increase ROM and increase strength to improve the patients ability to perform ADLs    20 min Neuromuscular Re-education:  [x]  See flow sheet : quad and glute re-education exercises    Rationale: increase ROM, increase strength, improve coordination, improve balance and increase proprioception  to improve the patients ability to improve mobility, stance stability, and gait        With   [x] TE   [] TA   [x] neuro   [] other: Patient Education: [x] Review HEP    [] Progressed/Changed HEP based on:   [x] positioning   [x] body mechanics   [] transfers   [] heat/ice application    [] other:      Other Objective/Functional Measures: Added orange tband to lateral bandwalks to improve strength in the LEs. Held some exercises secondary to pt being 13 mins late and pt needing to leave for work. Pain Level (0-10 scale) post treatment: 0    ASSESSMENT/Changes in Function: Reported no pain post session today in the B knees. Reported increased pressure in the right knee with lateral step ups. Needs cues for proper form of clams and s/l hip ABD exercises.  Pt reports having less pain overall in the B knees since starting therapy but continues to demonstrate limitations with glute strength and stability. Continue POC as tolerated. Patient will continue to benefit from skilled PT services to modify and progress therapeutic interventions, address functional mobility deficits, address ROM deficits, address strength deficits, analyze and address soft tissue restrictions, analyze and cue movement patterns, analyze and modify body mechanics/ergonomics, assess and modify postural abnormalities, address imbalance/dizziness and instruct in home and community integration to attain remaining goals. []  See Plan of Care  []  See progress note/recertification  []  See Discharge Summary         Progress towards goals / Updated goals:   Short Term Goals: To be accomplished in 2 weeks:  1.  Pt will report compliance with HEP in order to supplement PT treatment                          Eval = established              MET  2.  Pt will demonstrate right knee AROM to 125degrees of flexion in order to improve ability to ascend/descend curbs in the community              Eval = 100degrees              Pt reports havuing less pain overall in the B knees since starting therapy but continues to demonstrate limitations with glute strength and stability  3.  Pt will demonstrate right knee AROM to 0degrees extension in order to normalize gait pattern.               Eval = lacking 5degrees              MET = 3 hyperextension     Long Term Goals: To be accomplished in 9 treatments:  1.  Pt will demonstrate bilateral hip abduction strength to 5/5 in order to improve ability to ascend/descend curbs in the community.                Eval: right = 4/5, left = 3/5              Making progress  2.  Pt will demonstrate bilateral knee strength minimum 5/5 in order to improve ambulation distances for ADLs and community engagement.               Eval: ext: right = 4/5, left = 5-/5; flex: right = 4-/5, left = 4/5              Making progress  3.  Pt will score at least 54 on FOTO in order to improve overall function, decrease pain, and facilitate return to PLOF.               Eval = 32              To be reassessed at [address] [facility]  [x]  Upgrade activities as tolerated     [x]  Continue plan of care  [x]  Update interventions per flow sheet       []  Discharge due to:_  []  Other:_      Jenny Steiner, PT 3/6/2020  7:45 AM    Future Appointments   Date Time Provider Shabnam Castilloi   3/9/2020  8:30 AM [facility]   3/11/2020  8:00 AM [facility]

## 2020-03-11 ENCOUNTER — HOSPITAL ENCOUNTER (OUTPATIENT)
Dept: PHYSICAL THERAPY | Age: 35
Discharge: HOME | End: 2020-03-11
Payer: MEDICAID

## 2020-03-11 PROCEDURE — 97112 NEUROMUSCULAR REEDUCATION: CPT

## 2020-03-11 PROCEDURE — 97110 THERAPEUTIC EXERCISES: CPT

## 2020-03-11 NOTE — PROGRESS NOTES
PT DAILY TREATMENT NOTE 10-18    Patient Name: Lilly Argueta  Date:3/11/2020  : 1985  [x]  Patient  Verified  Payor: Ky Kimbrough / Plan: [employer] / Product Type: Managed Care Medicaid /    In time:811  Out time:843  Total Treatment Time (min): 32  Visit #: 6 of 9    Treatment Area: Right knee pain [M25.561]  Left knee pain [M25.562]    SUBJECTIVE  Pain Level (0-10 scale): 0  Any medication changes, allergies to medications, adverse drug reactions, diagnosis change, or new procedure performed?: [x] No    [] Yes (see summary sheet for update)  Subjective functional status/changes:   [] No changes reported  \"No pain right now. \"    OBJECTIVE    Modality rationale: patient declined   Min Type Additional Details    [] Estim:  []Unatt       []IFC  []Premod                        []Other:  []w/ice   []w/heat  Position:  Location:    [] Estim: []Att    []TENS instruct  []NMES                    []Other:  []w/US   []w/ice   []w/heat  Position:  Location:    []  Traction: [] Cervical       []Lumbar                       [] Prone          []Supine                       []Intermittent   []Continuous Lbs:  [] before manual  [] after manual    []  Ultrasound: []Continuous   [] Pulsed                           []1MHz   []3MHz W/cm2:  Location:    []  Iontophoresis with dexamethasone         Location: [] Take home patch   [] In clinic    []  Ice     []  heat  []  Ice massage  []  Laser   []  Anodyne Position:  Location:    []  Laser with stim  []  Other:  Position:  Location:    []  Vasopneumatic Device Pressure:       [] lo [] med [] hi   Temperature: [] lo [] med [] hi   [] Skin assessment post-treatment:  []intact []redness- no adverse reaction    []redness - adverse reaction:     8 min Therapeutic Exercise:  [x] See flow sheet :   Rationale: increase ROM and increase strength to improve the patients ability to perform ADLs    24 min Neuromuscular Re-education:  [x]  See flow sheet : quad re-ed activities   Rationale: increase ROM, increase strength, improve coordination, improve balance and increase proprioception  to improve the patients ability to improve mobility, stance stability, and gait        With   [x] TE   [] TA   [x] neuro   [] other: Patient Education: [x] Review HEP    [] Progressed/Changed HEP based on:   [x] positioning   [x] body mechanics   [] transfers   [] heat/ice application    [] other:      Other Objective/Functional Measures:   FOTO 51    AROM right knee 0-135 deg    MMT right hip abduction 4/5  MMT left hip abduction 4/5    MMT right knee extension 5/5  MMT left knee extension 5/5  MMT right knee flexion 4/5  MMT left knee flexion 5/5     Pain Level (0-10 scale) post treatment: 0    ASSESSMENT/Changes in Function: Ms. Nery Martin reports 50% improvement since beginning therapy. Pt reports ease with ROM and strength, but still reports pain with end range right knee flexion. She reports an improvement with stair negotiation as well. She states she still has difficulty with sitting or standing for long periods. We are discharging to an updated HEP at this time as pt states she will be unable to continue.     []  See Plan of Care  []  See progress note/recertification  [x]  See Discharge Summary         Progress towards goals / Updated goals:  Short Term Goals: To be accomplished in 2 weeks:  1.  Pt will report compliance with HEP in order to supplement PT treatment                          Eval = established              MET  2.  Pt will demonstrate right knee AROM to 125degrees of flexion in order to improve ability to ascend/descend curbs in the community              Eval = 100degrees              MET; 135 deg  3.  Pt will demonstrate right knee AROM to 0degrees extension in order to normalize gait pattern.               Eval = lacking 5degrees              MET; 0     Long Term Goals: To be accomplished in 9 treatments:  1.  Pt will demonstrate bilateral hip abduction strength to 5/5 in order to improve ability to ascend/descend curbs in the community.             Eval: right = 4/5, left = 3/5              NOT MET; B hip abduction 4/5  2.  Pt will demonstrate bilateral knee strength minimum 5/5 in order to improve ambulation distances for ADLs and community engagement.               Eval: ext: right = 4/5, left = 5-/5; flex: right = 4-/5, left = 4/5              PARTIALLY MET; B knee extension 5/5, right knee flexion 4/5, left knee flexion 5/5  3.  Pt will score at least 54 on FOTO in order to improve overall function, decrease pain, and facilitate return to PLOF.             Eval = 32              NOT MET; 51    Functional Gains: bending knee, lift leg, strength, stair negotiation  Functional Deficits: stretching, standing/sitting long periods, right knee pain  % improvement: 50%  Pain   Average: 4/10       Best: 0/10     Worst: 8-9/10  Patient Goal: \"gain strength back in my knee and get to be able to go back to the way it used to\"    PLAN  []  Upgrade activities as tolerated     []  Continue plan of care  []  Update interventions per flow sheet       [x]  Discharge due to: [address]  []  Other:_      Susana Wong PTA, CSCS 3/11/2020  8:45 AM    No future appointments.

## 2020-03-11 NOTE — PROGRESS NOTES
Physical Therapy Discharge Instructions      [facility] - [place] [address]  [address], [address] [phone] fax      Patient: Marko Fuentes  : 1985      Continue Home Exercise Program 2 times per day for 4 weeks, then decrease to 3 times per week      Continue with    [x] Ice  as needed      [] Heat           Follow up with MD:     [] Upon completion of therapy     [x] As needed      Recommendations:     [x]   Return to activity with home program    []   Return to activity with the following modifications:       []Post Rehab Program    []Join Independent aquatic program     [x]Return to/join local gym    Manuel Pompa PTA, CSCS   3/11/2020 8:39 AM

## 2020-03-18 NOTE — PROGRESS NOTES
[facility] - [place] [address]  [address], [address] [phone] fax    Discharge Summary  Patient name: Radhames Wade Start of Care: 2020   Referral source: Jaimee Alvarez MD : 1985                Medical Diagnosis: Right knee pain [M25.561]  Left knee pain [M25.562]  Payor: [facility] / Plan: Gro / Product Type: Managed Care Medicaid /  Onset Date:2020                Treatment Diagnosis: right and left knee pain    Prior Hospitalization: see medical history Provider#: [id number]   Medications: Verified on Patient summary List    Comorbidities: history of patellar subluxation, anxiety, sciatica, hernia repair x2   Prior Level of Function: functionally (I)     Visits from Start of Care: 6    Missed Visits: 2    Reporting Period : 20 to 3/11/20    Short Term Goals: To be accomplished in 2 weeks:  1.  Pt will report compliance with HEP in order to supplement PT treatment                          Eval = established              MET  2.  Pt will demonstrate right knee AROM to 125degrees of flexion in order to improve ability to ascend/descend curbs in the community              Eval = 100degrees              MET; 135 deg  3.  Pt will demonstrate right knee AROM to 0degrees extension in order to normalize gait pattern.               Eval = lacking 5degrees              MET; 0     Long Term Goals: To be accomplished in 9 treatments:  1.  Pt will demonstrate bilateral hip abduction strength to 5/5 in order to improve ability to ascend/descend curbs in the community.                Eval: right = 4/5, left = 3/5              NOT MET; B hip abduction 4/5  2.  Pt will demonstrate bilateral knee strength minimum 5/5 in order to improve ambulation distances for ADLs and community engagement.               Eval: ext: right = 4/5, left = 5-/5; flex: right = 4-/5, left = 4/5              PARTIALLY MET; B knee extension 5/5, right knee flexion 4/5, left knee flexion 5/5  3.  Pt will score at least 54 on FOTO in order to improve overall function, decrease pain, and facilitate return to PLOF.             Eval = 32              NOT MET; 51     Functional Gains: bending knee, lift leg, strength, stair negotiation  Functional Deficits: stretching, standing/sitting long periods, right knee pain  % improvement: 50%  Pain   Average: 4/10                  Best: 0/10                Worst: 8-9/10  Patient Goal: \"gain strength back in my knee and get to be able to go back to the way it used to\"       Assessment/Summary of care: Ms. Sergio Hunter reports 50% improvement since beginning therapy. Pt reports improvement with ROM and strength, but still reports pain with end range right knee flexion. She reports an improvement with stair negotiation as well. She states she still has difficulty with sitting or standing for long periods.  We are discharging to an updated HEP at this time as pt states she will be unable to continue with in-office visits    RECOMMENDATIONS:  [x]Discontinue therapy: []Patient has reached or is progressing toward set goals      [x]Patient self DC due to inability to attend      []Due to lack of appreciable progress towards set goals    Ko Hays, PT 3/18/2020 10:53 AM

## 2021-01-05 PROBLEM — D64.9 ANEMIA: Status: ACTIVE | Noted: 2021-01-05

## 2021-01-05 PROBLEM — N83.8 OVARIAN MASS, RIGHT: Status: ACTIVE | Noted: 2021-01-05
